# Patient Record
Sex: MALE | Race: WHITE | NOT HISPANIC OR LATINO | Employment: OTHER | URBAN - METROPOLITAN AREA
[De-identification: names, ages, dates, MRNs, and addresses within clinical notes are randomized per-mention and may not be internally consistent; named-entity substitution may affect disease eponyms.]

---

## 2019-01-30 ENCOUNTER — HOSPITAL ENCOUNTER (EMERGENCY)
Facility: CLINIC | Age: 54
Discharge: HOME | End: 2019-01-30
Attending: EMERGENCY MEDICINE | Admitting: EMERGENCY MEDICINE

## 2019-01-30 VITALS
TEMPERATURE: 98.5 F | RESPIRATION RATE: 20 BRPM | OXYGEN SATURATION: 99 % | HEART RATE: 100 BPM | BODY MASS INDEX: 38.43 KG/M2 | DIASTOLIC BLOOD PRESSURE: 85 MMHG | WEIGHT: 290 LBS | HEIGHT: 73 IN | SYSTOLIC BLOOD PRESSURE: 136 MMHG

## 2019-01-30 DIAGNOSIS — T69.029A TRENCH FOOT, UNSPECIFIED LATERALITY, INITIAL ENCOUNTER: ICD-10-CM

## 2019-01-30 PROCEDURE — 99282 EMERGENCY DEPT VISIT SF MDM: CPT | Performed by: EMERGENCY MEDICINE

## 2019-01-30 PROCEDURE — 99282 EMERGENCY DEPT VISIT SF MDM: CPT | Mod: Z6 | Performed by: EMERGENCY MEDICINE

## 2019-01-30 ASSESSMENT — ENCOUNTER SYMPTOMS
DYSURIA: 0
NAUSEA: 0
FEVER: 0
RHINORRHEA: 0
SHORTNESS OF BREATH: 0
DIARRHEA: 0
HEADACHES: 0
CHILLS: 0
PALPITATIONS: 0
VOMITING: 0
NECK PAIN: 0
ABDOMINAL PAIN: 0
SORE THROAT: 0

## 2019-01-30 ASSESSMENT — MIFFLIN-ST. JEOR: SCORE: 2204.31

## 2019-01-30 NOTE — ED TRIAGE NOTES
Patient presented to ED due to leg pain, bilateral blisters on feet, patient reported he is homeless and wanted a place to stay due to temperature. Patient also asking for prescription of organic medications.

## 2019-01-30 NOTE — DISCHARGE INSTRUCTIONS
Please make an appointment to [facility] with [facility] (phone: [phone] as soon as possible if you have any concerns.    Please try to keep your feet dry and limit walking.  He may use Tylenol and ibuprofen for your pain.    If Ty has discomfort from fever or other pain, he can have:  Acetaminophen (Tylenol) every 6 hours as needed. His dose is:    2 regular strength tabs (650 mg)                                     (43.2+ kg/96+ lb)    NOTE: If your acetaminophen (Tylenol) came with a dropper marked with 0.4 and 0.8 ml, call us ([phone]) or check with your doctor about the dose before using it.     AND/OR      Ibuprofen (Advil, Motrin) every 6 hours as needed. His/her dose is:    1 tab of the 600 mg prescription tabs                                                                  (60-80 kg/132-176 lb)

## 2019-01-30 NOTE — ED NOTES
[facility] left ED and is [facility]. Patient was given scrub pants and bus token.  Patient does have a bus pass but still wanted token so he would not use his pass.  Patient was given a phone and called aunt to pick him up. No answer and he left a message. Patient was informed that he would have to leave ED between 0630 and 0700. Patient is able to ambulate.

## 2019-01-30 NOTE — ED PROVIDER NOTES
"  History     Chief Complaint   Patient presents with     Leg Pain     bilateral leg pain, tender left leg, +blisters on both feet     HPI  Terry Carmona is a 53 year old male who with no past medical history presenting with foot pain.  The patient is homeless and he walks all day.  He only has 1 pair of socks.  He has had blisters in the bottom of his feet and his feet hurt when he walks.  No injuries or trauma.  No bleeding.  No swelling or redness to his skin.  The patient has not been outside for a long time, was just kicked out of [facility] he states.  No suicidal or homicidal ideations.  Denies drugs or alcohol use tonight.  Denies any other chest pain, fevers, chills or shortness of breath.    I have reviewed the Medications, Allergies, Past Medical and Surgical History, and Social History in the Epic system.    Review of Systems   Constitutional: Negative for chills and fever.   HENT: Negative for rhinorrhea and sore throat.    Respiratory: Negative for shortness of breath.    Cardiovascular: Negative for chest pain and palpitations.   Gastrointestinal: Negative for abdominal pain, diarrhea, nausea and vomiting.   Genitourinary: Negative for dysuria.   Musculoskeletal: Negative for neck pain.   Neurological: Negative for headaches.   Psychiatric/Behavioral: Negative for self-injury and suicidal ideas.       Physical Exam   BP: 136/85  Pulse: 100  Temp: 98.5  F (36.9  C)  Resp: 20  Height: 185.4 cm (6' 1\")  Weight: 131.5 kg (290 lb)  SpO2: 99 %      Physical Exam  Physical Exam   Constitutional: oriented to person, place, and time. appears well-developed and well-nourished.   HENT:   Head: Normocephalic and atraumatic.   Neck: Normal range of motion.   Pulmonary/Chest: Effort normal. No respiratory distress.   Cardiac: No murmurs, rubs, gallops. RRR.  Abdominal: Abdomen soft, nontender, nondistended. No rebound tenderness.  MSK: Lower extremity compartments are soft.  Range of motion in hips, " knees, ankles without restriction, no pain with range of motion of these joints.  No tenderness palpation over the pelvis, hips, thighs, knees or lower extremities.  No tenderness palpation over the ankle or foot.  The patient does have thick appearing skin on the bottom of his feet with an open blister consistent with trench foot.  All extremities are warm and well perfused with intact capillary refill.  Sensation grossly intact over the lower extremities.  Neurological: alert and oriented to person, place, and time.   Skin: Skin is warm and dry.   Psychiatric:  normal mood and affect.  behavior is normal. Thought content normal.     ED Course        Procedures    Labs Ordered and Resulted from Time of ED Arrival Up to the Time of Departure from the ED - No data to display         Assessments & Plan (with Medical Decision Making)   MDM  Patient presenting with foot pain and occasional leg spasm.  Patient here appears well and nontoxic.  He does admit that he is only here for a bad as it is very cold out today.  Patient does have trench foot, he will be given socks here in the emergency department and I did recommend trying to keep his feet dry.  We will give the patient information for local shelters and thus token if he desires.  Patient will be discharged.  There is no indication for imaging. .  No evidence of problems with joint such as septic joint or trauma.  No evidence of cellulitis or other infection.  No evidence of neurologic or vascular compromise.  No evidence of frostbite on examination.    I have reviewed the nursing notes.    I have reviewed the findings, diagnosis, plan and need for follow up with the patient.       Medication List      There are no discharge medications for this visit.         Final diagnoses:   Trench foot, unspecified laterality, initial encounter       1/30/2019   [facility], [place], EMERGENCY DEPARTMENT     Tamir Thapa MD  01/30/19 0519    Addendum: Patient stated he  wanted to leave before getting any socks.  Patient did not want a bus token or further treatment.  Patient is thinking clearly and able to make his own decisions.     Tamir Thapa MD  01/30/19 5420

## 2021-06-16 PROBLEM — L03.90 CELLULITIS: Status: ACTIVE | Noted: 2019-01-31

## 2024-03-20 ENCOUNTER — HOSPITAL ENCOUNTER (INPATIENT)
Facility: CLINIC | Age: 59
LOS: 2 days | Discharge: NURSING, REHAB OR LONG-TERM CARE | End: 2024-03-22
Attending: EMERGENCY MEDICINE | Admitting: INTERNAL MEDICINE
Payer: COMMERCIAL

## 2024-03-20 ENCOUNTER — APPOINTMENT (OUTPATIENT)
Dept: GENERAL RADIOLOGY | Facility: CLINIC | Age: 59
End: 2024-03-20
Attending: EMERGENCY MEDICINE
Payer: COMMERCIAL

## 2024-03-20 DIAGNOSIS — J18.9 COMMUNITY ACQUIRED PNEUMONIA, UNSPECIFIED LATERALITY: ICD-10-CM

## 2024-03-20 DIAGNOSIS — R09.02 HYPOXIA: ICD-10-CM

## 2024-03-20 LAB
ALBUMIN SERPL BCG-MCNC: 3.3 G/DL (ref 3.5–5.2)
ALP SERPL-CCNC: 76 U/L (ref 40–150)
ALT SERPL W P-5'-P-CCNC: 42 U/L (ref 0–70)
ANION GAP SERPL CALCULATED.3IONS-SCNC: 10 MMOL/L (ref 7–15)
ANION GAP SERPL CALCULATED.3IONS-SCNC: 12 MMOL/L (ref 7–15)
AST SERPL W P-5'-P-CCNC: 39 U/L (ref 0–45)
BASE EXCESS BLDV CALC-SCNC: 1.2 MMOL/L (ref -3–3)
BASOPHILS # BLD AUTO: 0.1 10E3/UL (ref 0–0.2)
BASOPHILS NFR BLD AUTO: 0 %
BILIRUB SERPL-MCNC: 0.6 MG/DL
BUN SERPL-MCNC: 10.6 MG/DL (ref 8–23)
BUN SERPL-MCNC: 7.3 MG/DL (ref 8–23)
CALCIUM SERPL-MCNC: 8.3 MG/DL (ref 8.8–10.2)
CALCIUM SERPL-MCNC: 8.4 MG/DL (ref 8.8–10.2)
CHLORIDE SERPL-SCNC: 104 MMOL/L (ref 98–107)
CHLORIDE SERPL-SCNC: 110 MMOL/L (ref 98–107)
CREAT SERPL-MCNC: 0.61 MG/DL (ref 0.67–1.17)
CREAT SERPL-MCNC: 0.73 MG/DL (ref 0.67–1.17)
DEPRECATED HCO3 PLAS-SCNC: 22 MMOL/L (ref 22–29)
DEPRECATED HCO3 PLAS-SCNC: 24 MMOL/L (ref 22–29)
EGFRCR SERPLBLD CKD-EPI 2021: >90 ML/MIN/1.73M2
EGFRCR SERPLBLD CKD-EPI 2021: >90 ML/MIN/1.73M2
EOSINOPHIL # BLD AUTO: 0.1 10E3/UL (ref 0–0.7)
EOSINOPHIL NFR BLD AUTO: 0 %
ERYTHROCYTE [DISTWIDTH] IN BLOOD BY AUTOMATED COUNT: 13.2 % (ref 10–15)
ERYTHROCYTE [DISTWIDTH] IN BLOOD BY AUTOMATED COUNT: 13.3 % (ref 10–15)
GLUCOSE SERPL-MCNC: 124 MG/DL (ref 70–99)
GLUCOSE SERPL-MCNC: 148 MG/DL (ref 70–99)
HCO3 BLDV-SCNC: 26 MMOL/L (ref 21–28)
HCT VFR BLD AUTO: 35.9 % (ref 40–53)
HCT VFR BLD AUTO: 37.7 % (ref 40–53)
HGB BLD-MCNC: 11.7 G/DL (ref 13.3–17.7)
HGB BLD-MCNC: 12.3 G/DL (ref 13.3–17.7)
HOLD SPECIMEN: NORMAL
IMM GRANULOCYTES # BLD: 0.1 10E3/UL
IMM GRANULOCYTES NFR BLD: 1 %
LACTATE SERPL-SCNC: 1 MMOL/L (ref 0.7–2)
LYMPHOCYTES # BLD AUTO: 1 10E3/UL (ref 0.8–5.3)
LYMPHOCYTES NFR BLD AUTO: 6 %
MCH RBC QN AUTO: 30.5 PG (ref 26.5–33)
MCH RBC QN AUTO: 31.3 PG (ref 26.5–33)
MCHC RBC AUTO-ENTMCNC: 32.6 G/DL (ref 31.5–36.5)
MCHC RBC AUTO-ENTMCNC: 32.6 G/DL (ref 31.5–36.5)
MCV RBC AUTO: 94 FL (ref 78–100)
MCV RBC AUTO: 96 FL (ref 78–100)
MONOCYTES # BLD AUTO: 1.5 10E3/UL (ref 0–1.3)
MONOCYTES NFR BLD AUTO: 9 %
NEUTROPHILS # BLD AUTO: 14.5 10E3/UL (ref 1.6–8.3)
NEUTROPHILS NFR BLD AUTO: 84 %
NRBC # BLD AUTO: 0 10E3/UL
NRBC BLD AUTO-RTO: 0 /100
O2/TOTAL GAS SETTING VFR VENT: 2 %
OXYHGB MFR BLDV: 86 % (ref 70–75)
PCO2 BLDV: 39 MM HG (ref 40–50)
PH BLDV: 7.43 [PH] (ref 7.32–7.43)
PLATELET # BLD AUTO: 246 10E3/UL (ref 150–450)
PLATELET # BLD AUTO: 252 10E3/UL (ref 150–450)
PO2 BLDV: 52 MM HG (ref 25–47)
POTASSIUM SERPL-SCNC: 3.5 MMOL/L (ref 3.4–5.3)
POTASSIUM SERPL-SCNC: 3.5 MMOL/L (ref 3.4–5.3)
PROCALCITONIN SERPL IA-MCNC: 0.63 NG/ML
PROT SERPL-MCNC: 6.2 G/DL (ref 6.4–8.3)
RBC # BLD AUTO: 3.84 10E6/UL (ref 4.4–5.9)
RBC # BLD AUTO: 3.93 10E6/UL (ref 4.4–5.9)
SAO2 % BLDV: 88.3 % (ref 70–75)
SODIUM SERPL-SCNC: 138 MMOL/L (ref 135–145)
SODIUM SERPL-SCNC: 144 MMOL/L (ref 135–145)
WBC # BLD AUTO: 16.4 10E3/UL (ref 4–11)
WBC # BLD AUTO: 17.1 10E3/UL (ref 4–11)

## 2024-03-20 PROCEDURE — 82805 BLOOD GASES W/O2 SATURATION: CPT | Performed by: EMERGENCY MEDICINE

## 2024-03-20 PROCEDURE — 82040 ASSAY OF SERUM ALBUMIN: CPT | Performed by: EMERGENCY MEDICINE

## 2024-03-20 PROCEDURE — 93005 ELECTROCARDIOGRAM TRACING: CPT | Performed by: EMERGENCY MEDICINE

## 2024-03-20 PROCEDURE — 87040 BLOOD CULTURE FOR BACTERIA: CPT | Performed by: EMERGENCY MEDICINE

## 2024-03-20 PROCEDURE — 36415 COLL VENOUS BLD VENIPUNCTURE: CPT | Performed by: EMERGENCY MEDICINE

## 2024-03-20 PROCEDURE — 250N000011 HC RX IP 250 OP 636: Performed by: EMERGENCY MEDICINE

## 2024-03-20 PROCEDURE — 250N000013 HC RX MED GY IP 250 OP 250 PS 637: Performed by: EMERGENCY MEDICINE

## 2024-03-20 PROCEDURE — 96366 THER/PROPH/DIAG IV INF ADDON: CPT

## 2024-03-20 PROCEDURE — 99207 PR APP CREDIT; MD BILLING SHARED VISIT: CPT | Mod: GC | Performed by: HOSPITALIST

## 2024-03-20 PROCEDURE — 99285 EMERGENCY DEPT VISIT HI MDM: CPT | Mod: 25 | Performed by: EMERGENCY MEDICINE

## 2024-03-20 PROCEDURE — 71046 X-RAY EXAM CHEST 2 VIEWS: CPT

## 2024-03-20 PROCEDURE — 84145 PROCALCITONIN (PCT): CPT | Performed by: EMERGENCY MEDICINE

## 2024-03-20 PROCEDURE — 83605 ASSAY OF LACTIC ACID: CPT | Performed by: EMERGENCY MEDICINE

## 2024-03-20 PROCEDURE — 93010 ELECTROCARDIOGRAM REPORT: CPT | Performed by: EMERGENCY MEDICINE

## 2024-03-20 PROCEDURE — 85027 COMPLETE CBC AUTOMATED: CPT

## 2024-03-20 PROCEDURE — 71046 X-RAY EXAM CHEST 2 VIEWS: CPT | Mod: 26 | Performed by: RADIOLOGY

## 2024-03-20 PROCEDURE — 258N000003 HC RX IP 258 OP 636: Performed by: EMERGENCY MEDICINE

## 2024-03-20 PROCEDURE — 99223 1ST HOSP IP/OBS HIGH 75: CPT | Mod: GC | Performed by: INTERNAL MEDICINE

## 2024-03-20 PROCEDURE — 258N000003 HC RX IP 258 OP 636

## 2024-03-20 PROCEDURE — 120N000002 HC R&B MED SURG/OB UMMC

## 2024-03-20 PROCEDURE — 85025 COMPLETE CBC W/AUTO DIFF WBC: CPT | Performed by: EMERGENCY MEDICINE

## 2024-03-20 PROCEDURE — 36415 COLL VENOUS BLD VENIPUNCTURE: CPT

## 2024-03-20 PROCEDURE — 96365 THER/PROPH/DIAG IV INF INIT: CPT

## 2024-03-20 PROCEDURE — 96368 THER/DIAG CONCURRENT INF: CPT

## 2024-03-20 RX ORDER — IBUPROFEN 600 MG/1
600 TABLET, FILM COATED ORAL ONCE
Status: COMPLETED | OUTPATIENT
Start: 2024-03-20 | End: 2024-03-20

## 2024-03-20 RX ORDER — ACETAMINOPHEN 325 MG/1
650 TABLET ORAL EVERY 4 HOURS PRN
Status: DISCONTINUED | OUTPATIENT
Start: 2024-03-20 | End: 2024-03-22 | Stop reason: HOSPADM

## 2024-03-20 RX ORDER — CEFTRIAXONE 2 G/1
2 INJECTION, POWDER, FOR SOLUTION INTRAMUSCULAR; INTRAVENOUS EVERY 24 HOURS
Status: DISCONTINUED | OUTPATIENT
Start: 2024-03-21 | End: 2024-03-22 | Stop reason: HOSPADM

## 2024-03-20 RX ORDER — AMOXICILLIN 250 MG
1 CAPSULE ORAL 2 TIMES DAILY PRN
Status: DISCONTINUED | OUTPATIENT
Start: 2024-03-20 | End: 2024-03-22 | Stop reason: HOSPADM

## 2024-03-20 RX ORDER — CALCIUM CARBONATE 500 MG/1
1000 TABLET, CHEWABLE ORAL 4 TIMES DAILY PRN
Status: DISCONTINUED | OUTPATIENT
Start: 2024-03-20 | End: 2024-03-22 | Stop reason: HOSPADM

## 2024-03-20 RX ORDER — DIPHENOXYLATE HCL/ATROPINE 2.5-.025MG
1 TABLET ORAL
Status: ON HOLD | COMMUNITY
Start: 2024-03-09 | End: 2024-03-22

## 2024-03-20 RX ORDER — AMOXICILLIN 250 MG
2 CAPSULE ORAL 2 TIMES DAILY PRN
Status: DISCONTINUED | OUTPATIENT
Start: 2024-03-20 | End: 2024-03-22 | Stop reason: HOSPADM

## 2024-03-20 RX ORDER — ONDANSETRON 2 MG/ML
4 INJECTION INTRAMUSCULAR; INTRAVENOUS EVERY 6 HOURS PRN
Status: DISCONTINUED | OUTPATIENT
Start: 2024-03-20 | End: 2024-03-22 | Stop reason: HOSPADM

## 2024-03-20 RX ORDER — IBUPROFEN 600 MG/1
600 TABLET, FILM COATED ORAL
Status: ON HOLD | COMMUNITY
Start: 2024-03-19 | End: 2024-03-22

## 2024-03-20 RX ORDER — CEFTRIAXONE 2 G/1
2 INJECTION, POWDER, FOR SOLUTION INTRAMUSCULAR; INTRAVENOUS ONCE
Qty: 20 ML | Refills: 0 | Status: COMPLETED | OUTPATIENT
Start: 2024-03-20 | End: 2024-03-20

## 2024-03-20 RX ORDER — LIDOCAINE 40 MG/G
CREAM TOPICAL
Status: DISCONTINUED | OUTPATIENT
Start: 2024-03-20 | End: 2024-03-22 | Stop reason: HOSPADM

## 2024-03-20 RX ORDER — ACETAMINOPHEN 650 MG/1
650 SUPPOSITORY RECTAL EVERY 4 HOURS PRN
Status: DISCONTINUED | OUTPATIENT
Start: 2024-03-20 | End: 2024-03-22 | Stop reason: HOSPADM

## 2024-03-20 RX ORDER — LOPERAMIDE HYDROCHLORIDE 2 MG/1
TABLET ORAL
Status: ON HOLD | COMMUNITY
Start: 2024-03-08 | End: 2024-03-22

## 2024-03-20 RX ORDER — ONDANSETRON 4 MG/1
4 TABLET, ORALLY DISINTEGRATING ORAL EVERY 6 HOURS PRN
Status: DISCONTINUED | OUTPATIENT
Start: 2024-03-20 | End: 2024-03-22 | Stop reason: HOSPADM

## 2024-03-20 RX ADMIN — SODIUM CHLORIDE 1000 ML: 9 INJECTION, SOLUTION INTRAVENOUS at 02:43

## 2024-03-20 RX ADMIN — AZITHROMYCIN MONOHYDRATE 500 MG: 500 INJECTION, POWDER, LYOPHILIZED, FOR SOLUTION INTRAVENOUS at 03:35

## 2024-03-20 RX ADMIN — CEFTRIAXONE SODIUM 2 G: 2 INJECTION, POWDER, FOR SOLUTION INTRAMUSCULAR; INTRAVENOUS at 02:42

## 2024-03-20 RX ADMIN — SODIUM CHLORIDE, POTASSIUM CHLORIDE, SODIUM LACTATE AND CALCIUM CHLORIDE 1000 ML: 600; 310; 30; 20 INJECTION, SOLUTION INTRAVENOUS at 10:16

## 2024-03-20 RX ADMIN — IBUPROFEN 600 MG: 600 TABLET, FILM COATED ORAL at 02:40

## 2024-03-20 ASSESSMENT — ACTIVITIES OF DAILY LIVING (ADL)
ADLS_ACUITY_SCORE: 35

## 2024-03-20 ASSESSMENT — COLUMBIA-SUICIDE SEVERITY RATING SCALE - C-SSRS: 1. IN THE PAST MONTH, HAVE YOU WISHED YOU WERE DEAD OR WISHED YOU COULD GO TO SLEEP AND NOT WAKE UP?: NO

## 2024-03-20 NOTE — ED NOTES
03-Aug-2017 Bed: ED06  Expected date:   Expected time:   Means of arrival:   Comments:  [id number]   58M   SOB   yellow

## 2024-03-20 NOTE — MEDICATION SCRIBE - ADMISSION MEDICATION HISTORY
Medication Scribe Admission Medication History    Admission medication history is complete. The information provided in this note is only as accurate as the sources available at the time of the update.    Information Source(s): Patient via in-person    Pertinent Information: Writer present to interview Pt for update on medications on PTA medication list, pt responded with a long conversation expressing frustration on his homelessness, haven't slept for the last 7 days outside in the cold, and no one cares. Pt continue to voice his frustration over extraterrestrial life, black dart, and the Food and Drug Administration, trying to get him to take synthetic drugs that is not good for him, therefore, he's not taking any of the medication listed on PTA medication list. Per pt chart, all recent medications has been pull from outside medication list and no DRH.      Changes made to PTA medication list:  Added: All  Deleted: None  Changed: None    Allergies reviewed with patient and updates made in EHR: yes    Medication History Completed By: Darling Zapata 3/20/2024 4:26 AM    PTA Med List   Medication Sig Last Dose    diphenoxylate-atropine (LOMOTIL) 2.5-0.025 MG tablet Take 1 tablet by mouth Unknown    ibuprofen (ADVIL/MOTRIN) 600 MG tablet Take 600 mg by mouth Unknown    loperamide (IMODIUM A-D) 2 MG tablet Take two tablets by mouth initially, then one tablet by mouth after each loose stool.  Maximum 8 tablets per day. Unknown    zinc oxide (DESITIN) 40 % paste Apply topically to affected area(s) 4 times daily as needed for Dry Skin. Unknown       show

## 2024-03-20 NOTE — PROGRESS NOTES
[facility]    Progress Note - Medicine Service, MAROON TEAM 2       Date of Admission:  3/20/2024    Assessment & Plan   Ty KLEBER Carmona is a 58 year old male w/ pmhx of bipolar and unstable housing who presents to the ED for diarrhea and cough concerning for CAP and gastroenteritis     # CAP  # Acute Hypoxemic Respiratory Failure, resolved  Patient presenting with 1wk history of URI sx (rhinorrhea, productive cough), recently started sleeping outside in that time frame, no fever/chills. CXR with bibasilar infiltrates consistent with infection, and bilateral crackles at the bases L >R on physical exam with notable leukocytosis and mild hypoxia requiring 2L NC. On room air when interviewed by staff in the evening.   - Patient declining flu/RSV/COVID swab  - Consider RVP if patient declines on below abx  - 1L LR  - Cont Azithro + CTX started in the ED, plan for 5d treatment coarse     # Diarrhea c/f gastroenteritis  Patient with intermittent diarrhea over the last 10 days, has continued to be able to eat and keep hydrated. Likely gastroenteritis, likely toxin-mediated vs viral vs bacterial in the setting of food insecurity and risk for foodborne illness. Hemodynamically stable, no hypotension, no clear sign of intravascular depletion, BMP reassuring.  - Consider C diff and enteric panel if continuing to have diarrhea     Diet: Combination Diet Regular Diet Adult    DVT Prophylaxis: Low Risk/Ambulatory with no VTE prophylaxis indicated  Rivas Catheter: Not present  Fluids: 1L LR  Lines: None     Cardiac Monitoring: None  Code Status: Full Code      Clinically Significant Risk Factors Present on Admission          # Hypocalcemia: Lowest Ca = 8.3 mg/dL in last 2 days, will monitor and replace as appropriate     # Hypoalbuminemia: Lowest albumin = 3.3 g/dL at 3/20/2024  2:14 AM, will monitor as appropriate          # Obesity: Estimated body mass index is 33.97 kg/m  as  "calculated from the following:    Height as of this encounter: 1.88 m (6' 2\").    Weight as of this encounter: 120 kg (264 lb 8.8 oz).              Disposition Plan      Expected Discharge Date: 03/22/2024                The patient's care was discussed with the Attending Physician, Dr. Nascimento .    Aparna Bhatti MD  Medicine Service, [address]  Securely message with [employer] (more info)  Text page via [facility] Paging/Directory   See signed in provider for up to date coverage information  ______________________________________________________________________    Interval History   Please see H&P for overnight events.    This morning patient expresses frustration with being woken up. Uses frequent expletives while deferring additional questions. Responded that questions are asked to assess improvement in symptoms. He continued to use multiple expletives and was yelling - I told him cursing at staff would not be tolerated. Patient continued cursing at myself. I told him he could speak with staff later on and left the room.     Physical Exam   Vital Signs: Temp: (!) 100.7  F (38.2  C) Temp src: Oral BP: 119/57 Pulse: 91   Resp: 17 SpO2: 94 % O2 Device: None (Room air) Oxygen Delivery: 2 LPM  Weight: 264 lbs 8.83 oz    Deferred given agitation and profanity.     Constitutional: [place] pulled over head, unable to assess    Data   Reviewed from last night  "

## 2024-03-20 NOTE — ED TRIAGE NOTES
BP (!) 161/63   Pulse (!) 127   Temp (!) 100.7  F (38.2  C) (Oral)   Resp 18   SpO2 92%     Patient BIBA from [facility] due to SOB and some cough. Patient states that he is shelter less.      Triage Assessment (Adult)       Row Name 03/20/24 0205          Triage Assessment    Airway WDL WDL        Respiratory WDL    Respiratory WDL WDL        Skin Circulation/Temperature WDL    Skin Circulation/Temperature WDL WDL        Cardiac WDL    Cardiac WDL X     Cardiac Rhythm ST        Peripheral/Neurovascular WDL    Peripheral Neurovascular WDL WDL        Cognitive/Neuro/Behavioral WDL    Cognitive/Neuro/Behavioral WDL WDL

## 2024-03-20 NOTE — H&P
[facility]    History and Physical - Medicine Service, MAROON TEAM        Date of Admission:  3/20/2024    Assessment & Plan   Ty KLEBER Carmona is a 58 year old male w/ pmhx of bipolar and unstable housing who presents to the ED for diarrhea and cough concerning for CAP and gastroenteritis    # CAP  # Mild Acute Hypoxic Respiratory Failure  Patient presenting with 1wk history of URI sx (rhinorrhea, productive cough), recently started sleeping outside in that time frame, no fever/chills. CXR with bibasilar infiltrates consistent with infection, and bilateral crackles at the bases L >R on physical exam with notable leukocytosis and mild hypoxia requiring 2L NC.  - Cont Azithro + CTX started in the ED, plan for 5d treatment coarse  - Defer sputum culture, suspect will not significantly  at this juncture  - Defer RVP  - Flu/Influenza/Covid pending collection  - Defer AM labs given recent collection 0215, can consider PM labs.    # Diarrhea c/f gastroenteritis  Patient with intermittent diarrhea over the last 10 days, has continued to be able to eat and keep hydrated. Likely gastroenteritis, likely toxin-mediated vs viral vs bacterial in the setting of food insecurity and risk for foodborne illness. Hemodynamically stable, no hypotension, no clear sign of intravascular depletion, BMP reassuring.  - Supportive cares  - Consider C diff and enteric panel if continuing to have diarrhea    Diet:  Regular  DVT Prophylaxis: Low Risk/Ambulatory with no VTE prophylaxis indicated, SCDs  Rivas Catheter: Not present  Fluids: None  Lines: None     Cardiac Monitoring: None  Code Status:  Full    Clinically Significant Risk Factors Present on Admission          # Hypocalcemia: Lowest Ca = 8.4 mg/dL in last 2 days, will monitor and replace as appropriate     # Hypoalbuminemia: Lowest albumin = 3.3 g/dL at 3/20/2024  2:14 AM, will monitor as appropriate                  "    Disposition Plan      Expected Discharge Date: 03/22/2024                The patient's care was discussed with the Attending Physician, Dr. Funez .      Edmundo Jain MD  Medicine Service, [facility]  Securely message with [employer] (more info)  Text page via [facility] Paging/Directory   See signed in provider for up to date coverage information  ______________________________________________________________________    Chief Complaint   Diarrhea, Cough    History is obtained from the patient    History of Present Illness   Ty KLEBER Carmona is a 58 year old male w/ pmhx of bipolar and unstable housing  who presents to the ED for diarrhea and cough.    Patient reports that he has had unstable housing since late 2/2024, and has slept outside for at least the last week. Began having diarrhea about 10 days ago, and having URI sx (runny nose, productive cough) around 7d ago. Has intermittently resorted to \"eating trash\" though not regularly, does not currently believe this is related to his GI symptoms.    Patient overall pleasant. Had extensive conversation regarding hexagonal water (as components of various fruits/vegetables, health benefits, etc) and his network of wealthy individuals. 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    Of note, patient has presented to [facility], [facility], and [facility] for similar within the last 2 weeks.  1 wk URI, 3d diarrhea, CAP sx, 87-88, low-mid 90s on NC. White count to 17. CTX and Azithro with fluids.    In the ED:   Vitals: 160/60, HR 120s, febrile to 100.7, on 2L NC  Labs: WBC to 17.1, Hgb 11.7, CMP overall reassuring, VBG 7.43/39/52/26, procal mildly elevated to 0.63, lactate wnl  Imaging: CXR with bibasilar infiltrates L > R representing likely infectious vs inflammatory process.  Interventions: Started CTX + Azithro, given ibuprofen 600mg      Past Medical History    No past " medical history on file.    Past Surgical History   No past surgical history on file.    Prior to Admission Medications   None        Physical Exam   Vital Signs: Temp: (!) 100.7  F (38.2  C) Temp src: Oral BP: (!) 161/63 Pulse: (!) 127   Resp: 18 SpO2: 92 % O2 Device: Nasal cannula Oxygen Delivery: 2 LPM  Weight: 0 lbs 0 oz    Constitutional: awake, alert, cooperative, no apparent distress, and appears stated age  Respiratory: No increased work of breathing, good air exchange, clear to auscultation bilaterally, no crackles or wheezing  Cardiovascular: Regular rate and rhythm, no murmur noted  GI: Normal bowel sounds, soft, non-distended, non-tender    Data     I have personally reviewed the following data over the past 24 hrs:    17.1 (H)  \   11.7 (L)   / 246     138 104 10.6 /  148 (H)   3.5 22 0.73 \     ALT: 42 AST: 39 AP: 76 TBILI: 0.6   ALB: 3.3 (L) TOT PROTEIN: 6.2 (L) LIPASE: N/A     Procal: 0.63 (H) CRP: N/A Lactic Acid: 1.0         Imaging results reviewed over the past 24 hrs:   Recent Results (from the past 24 hour(s))   Chest XR,  PA & LAT    Narrative    EXAM: XR CHEST 2 VIEWS  LOCATION: [facility]  DATE: 3/20/2024    INDICATION: Cough. Fever.  COMPARISON: None.      Impression    IMPRESSION: Bibasilar infiltrates, more on the left, likely an infectious or an inflammatory process. No cavitation, suspicious adenopathy or pleural effusion on either side. Normal cardiac size and pulmonary vascularity. Degenerative changes both   shoulders and the spine. Monitoring leads overlying the chest. No prior study available for comparison. Current study will serve as a baseline for future follow-up.      Normal for race

## 2024-03-20 NOTE — PROGRESS NOTES
"Pt refusing routine vitals for duration of shift. Has been rude to staff and using profanities. Pt called nurse into room and stated that he had defecated and urinated into garbage can because he \"couldn't wait\" to walk to the bathroom. Writer emptied garbage can and brought commode into room for future use.  "

## 2024-03-20 NOTE — ED PROVIDER NOTES
"  History     Chief Complaint   Patient presents with    Shortness of Breath     HPI  Terry Carmona is a 58 year old male with PMH notable for bipolar disorder, homelessness  who presents to the ED with cough and diarrhea.  Patient reports cough started nearly a week ago, associated with sore throat.  Diarrhea started 3 days ago, was decreased this past day.  Not having abdominal pain, is nauseous but has not had vomiting.  He is unsure if he has had fever, no bodyaches.  Does feel somewhat short of breath.  Patient notes being at [facility] recently for evaluation of similar symptoms.      EMS notes initial SpO2 of 88% on room air, placed 2 L O2 by NC with rise into the 90s.  EMS performed lung ultrasound, showed images to this provider.  Ultrasound (3 clips shown) demonstrated A-line predominance with 1 B-line per field in each clip.    Physical Exam   BP: (!) 161/63  Pulse: (!) 127  Temp: (!) 100.7  F (38.2  C)  Resp: 18  Height: 188 cm (6' 2\")  Weight: 120 kg (264 lb 8.8 oz)  SpO2: 92 %    Physical Exam  General: Appears mildly dyspneic. Appears stated age.   HENT: MMM, no oropharyngeal lesions  Eyes: PERRL, normal sclerae   Cardio: Tachycardic rate. Regular rhythm. Extremities well perfused  Resp: Mildly increased work of breathing, Normal respiratory rate.   Abdomen: no tenderness, non-distended, no rebound, no guarding  Neuro: alert without signs of confusion. CN II-XII grossly intact. Grossly normal strength and sensation in all extremities.   Psych: normal affect, normal behavior      ED Course      Procedures            EKG Interpretation:      Interpreted by Tomas Salamanca MD  Time reviewed: 0220  Symptoms at time of EKG: SOB   Rhythm: sinus tachycardia  Rate: 116  Axis: Normal  Ectopy: none  Conduction: normal  ST Segments/ T Waves: No acute ischemic changes  Q Waves: none  Comparison to prior: No old EKG available    Clinical Impression: Sinus tachycardia without evidence of acute ischemia     Labs " Ordered and Resulted from Time of ED Arrival to Time of ED Departure   COMPREHENSIVE METABOLIC PANEL - Abnormal       Result Value    Sodium 138      Potassium 3.5      Carbon Dioxide (CO2) 22      Anion Gap 12      Urea Nitrogen 10.6      Creatinine 0.73      GFR Estimate >90      Calcium 8.4 (*)     Chloride 104      Glucose 148 (*)     Alkaline Phosphatase 76      AST 39      ALT 42      Protein Total 6.2 (*)     Albumin 3.3 (*)     Bilirubin Total 0.6     BLOOD GAS VENOUS - Abnormal    pH Venous 7.43      pCO2 Venous 39 (*)     pO2 Venous 52 (*)     Bicarbonate Venous 26      Base Excess/Deficit Venous 1.2      FIO2 2      Oxyhemoglobin Venous 86 (*)     O2 Sat, Venous 88.3 (*)    PROCALCITONIN - Abnormal    Procalcitonin 0.63 (*)    CBC WITH PLATELETS AND DIFFERENTIAL - Abnormal    WBC Count 17.1 (*)     RBC Count 3.84 (*)     Hemoglobin 11.7 (*)     Hematocrit 35.9 (*)     MCV 94      MCH 30.5      MCHC 32.6      RDW 13.3      Platelet Count 246      % Neutrophils 84      % Lymphocytes 6      % Monocytes 9      % Eosinophils 0      % Basophils 0      % Immature Granulocytes 1      NRBCs per 100 WBC 0      Absolute Neutrophils 14.5 (*)     Absolute Lymphocytes 1.0      Absolute Monocytes 1.5 (*)     Absolute Eosinophils 0.1      Absolute Basophils 0.1      Absolute Immature Granulocytes 0.1      Absolute NRBCs 0.0     LACTIC ACID WHOLE BLOOD - Normal    Lactic Acid 1.0     INFLUENZA A/B, RSV, & SARS-COV2 PCR   BLOOD CULTURE   BLOOD CULTURE     Chest XR,  PA & LAT   Final Result   IMPRESSION: Bibasilar infiltrates, more on the left, likely an infectious or an inflammatory process. No cavitation, suspicious adenopathy or pleural effusion on either side. Normal cardiac size and pulmonary vascularity. Degenerative changes both    shoulders and the spine. Monitoring leads overlying the chest. No prior study available for comparison. Current study will serve as a baseline for future follow-up.               Medical  Decision Making  The patient's presentation was of high complexity (an acute health issue posing potential threat to life or bodily function).    The patient's evaluation involved:  ordering and/or review of 3+ test(s) in this encounter (see separate area of note for details)  independent interpretation of testing performed by another health professional (CXR)  discussion of management or test interpretation with another health professional (admitting hospitalist)    The patient's management necessitated high risk (a decision regarding hospitalization).      Assessments & Plan   Patient presenting with cough and shortness of breath. Vitals in the ED notable for fever 100.7 Fahrenheit, SpO2 in upper 80s on room air and low to mid 90s on 2 L O2 by NC, tachycardia in 120s. Nursing notes reviewed. Initial differential diagnosis includes but not limited to sepsis, pneumonia, viral URI, COVID, influenza.     Chart review notable for ED visits at [facility] and [facility] over the past few days.  Chest x-ray report from [facility] notes diffuse infiltrate suggestive of potential pulmonary edema.  In clinical context this is more likely pneumonia.    Tonight, chest x-ray again shows infiltrates this time more on the left and in the bases.  Leukocytosis present with WBC of 17.  Lactate normal.  Procalcitonin is mildly elevated 0.63.  Viral studies ordered and pending.  Blood cultures drawn.    Ceftriaxone and azithromycin given for likely community-acquired pneumonia.  NS bolus given.  Heart rate improving.    The complete clinical picture is most consistent with community-acquired pneumonia with hypoxia. After counseling on the diagnosis, work-up, and treatment plan, the patient was admitted to medicine.    Final diagnoses:   Community acquired pneumonia, unspecified laterality   Hypoxia     New Prescriptions    No medications on file     --  Tomas Salamanca MD   Emergency Medicine   [facility] EMERGENCY  DEPARTMENT  3/20/2024       Tomas Salamanca MD  03/20/24 6292

## 2024-03-21 PROBLEM — J18.9 COMMUNITY ACQUIRED PNEUMONIA: Status: ACTIVE | Noted: 2024-03-21

## 2024-03-21 LAB
ATRIAL RATE - MUSE: 116 BPM
DIASTOLIC BLOOD PRESSURE - MUSE: NORMAL MMHG
INTERPRETATION ECG - MUSE: NORMAL
P AXIS - MUSE: 74 DEGREES
PR INTERVAL - MUSE: 138 MS
QRS DURATION - MUSE: 86 MS
QT - MUSE: 310 MS
QTC - MUSE: 430 MS
R AXIS - MUSE: 57 DEGREES
SYSTOLIC BLOOD PRESSURE - MUSE: NORMAL MMHG
T AXIS - MUSE: 39 DEGREES
VENTRICULAR RATE- MUSE: 116 BPM

## 2024-03-21 PROCEDURE — G0378 HOSPITAL OBSERVATION PER HR: HCPCS

## 2024-03-21 PROCEDURE — 99233 SBSQ HOSP IP/OBS HIGH 50: CPT | Performed by: INTERNAL MEDICINE

## 2024-03-21 PROCEDURE — 120N000002 HC R&B MED SURG/OB UMMC

## 2024-03-21 ASSESSMENT — ACTIVITIES OF DAILY LIVING (ADL)
ADLS_ACUITY_SCORE: 39

## 2024-03-21 NOTE — PROGRESS NOTES
2058    6MS ADMISSION    D: Patient admitted from [place] via stretcher for Hypoxia, cough and diarrhea.     I: Upon arrival to the unit patient was oriented to room, unit, and call light. Patient s height, weight, and vital signs were obtained. NKA. Provider notified of patient s arrival on the unit. Patient refused full skin assessment and adult AVS. Care plan initiated.    A: Vital signs stable upon admission. No reports of pain. Refused full skin assessment.    P: Continue to monitor patient and intervene as needed. Continue with plan of care. Notify provider with any concerns or changes in patient status.

## 2024-03-21 NOTE — PLAN OF CARE
"2058-0700    Patient is A&O x4. Call light within reach, able to make needs known. SBA/independent in the room. Voids spontaneously to the bathroom. LBM: 03/20.    RA. Regular diet. PIV on R hand, SL.     Upon arrival to the patient's room, patient refused full skin assessment and verbalized \"I don't want to, my body cleanses itself. I don't want to be disturbed. I just wanted to know where's the bathroom at and wanted to sleep. I wanted to stay here because I am homeless and I needed a place to stay.\" Explained the importance of full assessment and monitoring but patient insisted not to be checked. Patient opted to keep his clothes and refused to changed into gown. Refused to answer admission questions and said we can try again in the morning. Refused COVID swab and scheduled antibiotics, provider notified.     0257- Patient triggered sepsis. Patient continuously refusing cares, medications and assessment. Scheduled Lactic Acid draw at 0800. Provider notified.     Agreed to do initial vital signs. Noted infrequent dry congested cough. Continue with POC.   "

## 2024-03-21 NOTE — PROGRESS NOTES
"Got report from outgoing RN Cathy Pemberton, went to assess and get vitals from pt but he refused and and asked that writer \"walk out and tell them I refused.\" EMS picked pt up around 2020 to [facility] 6MS via ambulance  "

## 2024-03-21 NOTE — PROGRESS NOTES
"[id number]    Pt alert and oriented times 4, uncooperative. Pt refused any assessment and cares and said \"Get out and close the door!\". Pt refused to nasal swab for Respiratory panel PCR , Pt wanted oral swab. Lab called if I can do oral swab and they said we do not accept oral swab.  No acute event during this shift, call light with in reach, and continue with POC.     Expected Discharge Date: 03/23/2024         Blood pressure 135/86, pulse 96, temperature 98.4  F (36.9  C), temperature source Oral, resp. rate 18, height 1.88 m (6' 2\"), weight 120 kg (264 lb 8.8 oz), SpO2 98%.     "

## 2024-03-21 NOTE — UTILIZATION REVIEW
"    Admission Status; Secondary Review Determination         Under the authority of the Utilization Management Committee, the utilization review process indicated a secondary review on the above patient.  The review outcome is based on review of the medical records, discussions with staff, and applying clinical experience noted on the date of the review.          (x) Observation Status Appropriate - This patient does not meet hospital inpatient criteria and is placed in observation status. If this patient's primary payer is Medicare and was admitted as an inpatient, Condition Code 44 should be used and patient status changed to \"observation\".     RATIONALE FOR DETERMINATION    58-year-old male with a history of bipolar disorder and unstable housing presented to the ED with symptoms suggestive of community-acquired pneumonia (CAP) and gastroenteritis. He exhibited a one-week history of upper respiratory tract infection symptoms and recent outdoor sleeping, with CXR revealing bibasilar infiltrates consistent with infection and bilateral crackles on exam. Treatment with azithromycin and ceftriaxone was initiated, with plans for a five-day course. Additionally, he experienced intermittent diarrhea over the past ten days, suspected to be gastroenteritis, with supportive care provided and consideration for further testing if symptoms persist. Despite initial mild hypoxia requiring 2L nasal cannula, his oxygen saturation this morning improved to 96% on room air.  The severity of illness, intensity of service provided, expected LOS and risk for adverse outcome make the care appropriate for further observation; however, doesn't meet criteria for hospital inpatient admission. Dr Roach  notified of this determination.    This document was produced using voice recognition software.      The information on this document is developed by the utilization review team in order for the business office to ensure compliance.  This " only denotes the appropriateness of proper admission status and does not reflect the quality of care rendered.         The definitions of Inpatient Status and Observation Status used in making the determination above are those provided in the CMS Coverage Manual, Chapter 1 and Chapter 6, section 70.4.      Sincerely,     MAX LAW MD    System Medical Director  Utilization [facility].

## 2024-03-21 NOTE — PLAN OF CARE
"Goal Outcome Evaluation:  Plan of Care Reviewed With: patient  Overall Patient Progress: improvingOverall Patient Progress: improving    VS: Temp: 99.4  F (37.4  C) Temp src: Oral BP: 133/83 Pulse: 98   Resp: 19 SpO2: 96 % O2 Device: None (Room air)      O2: RA   Output: Voids spontaneously in bathroom   Last BM: Unknown, and patient refused assessment   Activity: Independent, self-ambulated in freed way   Skin: Refused skin assessment, superficial skin is intact   Pain: Denied    CMS: A&O x 4, mild generalized weakness   Dressing: Unable to assess   Diet: Regular, appetite was adequate    LDA: RAYOEL   Equipment: Personal items   Plan: Potential discharge tomorrow. Pt can communicate his care, call light within reach. Continue to monitor   Additional Info: Pt is uncooperative with care some times, discriminates staff and said \" I am white to white racist, I only wants white doctors, nurses/ lab staff\"  Pt refused Lab draw this morning, after two unsuccessful attempts by lab staff provider talk to patient and reschedule lab draw for tomorrow. Charge nurse and unit manager are aware patient's behavior.     "

## 2024-03-21 NOTE — PROGRESS NOTES
"[facility]    Medicine Progress Note - Hospitalist Service, GOLD TEAM 16    Date of Admission:  3/20/2024    Assessment & Plan   CAP with acute hypoxemic respiratory failure:  Presented with 1 week history of viral URI symptoms with rhinorrhea, nasal congestion and cough productive of yellow phlegm.  Patient is homeless and had been sleeping outside during that timeframe.  CXR showed bibasilar infiltrates consistent with infection, WBC 17.1 with slightly elevated procalcitonin.  VBG negative for CO2 retention.  LFTs, BMP and lactate normal.  Initially hypoxemic with temperature 100.8.  Started empirically on IV ceftriaxone and azithromycin.    Suspect viral URI with possible superimposed bacterial pneumonia.  Subsequently weaned off oxygen and oxygenating well on room air.  Blood cultures negative thus far.  Patient refused viral nasal swab, stating \"I do not want anybody shoving anything up into my brain\".  Continues to have cough productive of yellow phlegm, nasal drainage and congestion.  Temperature is much improved and continues to oxygenate well on room air.  Trace rales at the bases with minimal apical wheezing, exam otherwise unremarkable.  Patient refused labs 3/21, stating he did not like the look of the person who arrived to do it.  Patient now agreeable with having a viral panel if by oropharyngeal swab and agrees to have labs drawn in a.m. 3/22.  -Continue ceftriaxone and azithromycin  -Reattempt viral panel via oropharyngeal swab  -Check CBC, BMP 3/22    Diarrhea:  Patient reported intermittent diarrhea over the preceding 10 days PTA, but continue to eat and stay hydrated.  Suspect gastroenteritis possibly toxin mediated versus viral versus bacterial in the setting of food insecurity.  Diarrhea now resolved, having small, formed stools.  Eating well.  Trying to eat smaller meals to avoid nausea.    H/O Bipolar 1 D/O  Housing insecurity  Has not been on any " "medications PTA.  Has rather bizarre beliefs and opinions about healthcare and treatments.  Speech pressured but coherent.  Denies any hallucinations.      Diet: Combination Diet Regular Diet Adult    DVT Prophylaxis: Low Risk/Ambulatory with no VTE prophylaxis indicated  Rivas Catheter: Not present  Lines: None     Cardiac Monitoring: None  Code Status: Full Code      Clinically Significant Risk Factors          # Hypocalcemia: Lowest Ca = 8.3 mg/dL in last 2 days, will monitor and replace as appropriate     # Hypoalbuminemia: Lowest albumin = 3.3 g/dL at 3/20/2024  2:14 AM, will monitor as appropriate            # Obesity: Estimated body mass index is 33.97 kg/m  as calculated from the following:    Height as of this encounter: 1.88 m (6' 2\").    Weight as of this encounter: 120 kg (264 lb 8.8 oz)., PRESENT ON ADMISSION            Disposition Plan      Expected Discharge Date: 03/23/2024                    Silverio Cantor MD  Hospitalist Service, GOLD TEAM 16  [facility]  Securely message with [employer] (more info)  Text page via [facility] Paging/Directory   See signed in provider for up to date coverage information  ______________________________________________________________________    Interval History   Diarrhea resolved, having small formed stools.  Tolerating oral intake well, trying to eat smaller meals to avoid nausea.  Continues to have some chills, fevers improved.  Denies any dyspnea at rest or with exertion.  Weaned off oxygen overnight.  Continues to have sinus congestion and rhinorrhea, ears feel plugged up.  Continued cough but improving, yellow phlegm.  No hemoptysis.  Denies any pain.  No dysuria or urgency.    Physical Exam   Vital Signs: Temp: 99.4  F (37.4  C) Temp src: Oral BP: 133/83 Pulse: 98   Resp: 19 SpO2: 96 % O2 Device: None (Room air)    Weight: 264 lbs 8.83 oz  General: Alert and oriented x 4.  Speech pressured but coherent.  " "Pleasant.  HEENT: No icterus or injection.  EOM intact.  Neck supple, nontender.  Chest: Trace rales at the bases with minimal apical wheezing.  CV: RRR.  No murmurs.  Abdomen: NABS.  Soft, nontender, nondistended.  Extremities: No edema.  Neuro: Intact    60 MINUTES SPENT BY ME on the date of service doing chart review, history, exam, documentation & further activities per the note.      Data      CMP  Recent Labs   Lab 03/20/24  0732 03/20/24 0214    138   POTASSIUM 3.5 3.5   CHLORIDE 110* 104   CO2 24 22   ANIONGAP 10 12   * 148*   BUN 7.3* 10.6   CR 0.61* 0.73   GFRESTIMATED >90 >90   GIFTY 8.3* 8.4*   PROTTOTAL  --  6.2*   ALBUMIN  --  3.3*   BILITOTAL  --  0.6   ALKPHOS  --  76   AST  --  39   ALT  --  42     CBC  Recent Labs   Lab 03/20/24  0732 03/20/24 0214   WBC 16.4* 17.1*   RBC 3.93* 3.84*   HGB 12.3* 11.7*   HCT 37.7* 35.9*   MCV 96 94   MCH 31.3 30.5   MCHC 32.6 32.6   RDW 13.2 13.3    246     INRNo lab results found in last 7 days.  Arterial Blood Gas  Recent Labs   Lab 03/20/24 0214   O2PER 2   Venous Blood Gas  Recent Labs   Lab 03/20/24 0214   PHV 7.43   PCO2V 39*   PO2V 52*   HCO3V 26   AMARIS 1.2   O2PER 2   No results found for: \"A1C\", \"HEMOGLOBINA1\"  Results for orders placed or performed during the hospital encounter of 03/20/24   Chest XR,  PA & LAT    Narrative    EXAM: XR CHEST 2 VIEWS  LOCATION: [facility]  DATE: 3/20/2024    INDICATION: Cough. Fever.  COMPARISON: None.      Impression    IMPRESSION: Bibasilar infiltrates, more on the left, likely an infectious or an inflammatory process. No cavitation, suspicious adenopathy or pleural effusion on either side. Normal cardiac size and pulmonary vascularity. Degenerative changes both   shoulders and the spine. Monitoring leads overlying the chest. No prior study available for comparison. Current study will serve as a baseline for future follow-up.              "

## 2024-03-22 ENCOUNTER — HOSPITAL ENCOUNTER (EMERGENCY)
Facility: CLINIC | Age: 59
Discharge: NURSING, REHAB OR LONG-TERM CARE | End: 2024-03-22
Attending: STUDENT IN AN ORGANIZED HEALTH CARE EDUCATION/TRAINING PROGRAM | Admitting: STUDENT IN AN ORGANIZED HEALTH CARE EDUCATION/TRAINING PROGRAM
Payer: COMMERCIAL

## 2024-03-22 VITALS
SYSTOLIC BLOOD PRESSURE: 104 MMHG | BODY MASS INDEX: 32.47 KG/M2 | DIASTOLIC BLOOD PRESSURE: 66 MMHG | HEART RATE: 90 BPM | HEIGHT: 73 IN | WEIGHT: 245 LBS | RESPIRATION RATE: 18 BRPM | OXYGEN SATURATION: 98 % | TEMPERATURE: 98 F

## 2024-03-22 VITALS
OXYGEN SATURATION: 90 % | BODY MASS INDEX: 33.95 KG/M2 | HEART RATE: 89 BPM | RESPIRATION RATE: 17 BRPM | SYSTOLIC BLOOD PRESSURE: 95 MMHG | WEIGHT: 264.55 LBS | DIASTOLIC BLOOD PRESSURE: 58 MMHG | TEMPERATURE: 99.3 F | HEIGHT: 74 IN

## 2024-03-22 DIAGNOSIS — R19.5 LOOSE STOOLS: ICD-10-CM

## 2024-03-22 DIAGNOSIS — J18.9 COMMUNITY ACQUIRED PNEUMONIA, UNSPECIFIED LATERALITY: ICD-10-CM

## 2024-03-22 LAB
ALBUMIN SERPL BCG-MCNC: 3.4 G/DL (ref 3.5–5.2)
ALP SERPL-CCNC: 76 U/L (ref 40–150)
ALT SERPL W P-5'-P-CCNC: 42 U/L (ref 0–70)
ANION GAP SERPL CALCULATED.3IONS-SCNC: 10 MMOL/L (ref 7–15)
ANION GAP SERPL CALCULATED.3IONS-SCNC: 13 MMOL/L (ref 7–15)
AST SERPL W P-5'-P-CCNC: 24 U/L (ref 0–45)
BASOPHILS # BLD AUTO: 0.1 10E3/UL (ref 0–0.2)
BASOPHILS NFR BLD AUTO: 1 %
BILIRUB SERPL-MCNC: 0.2 MG/DL
BUN SERPL-MCNC: 10.3 MG/DL (ref 8–23)
BUN SERPL-MCNC: 16.1 MG/DL (ref 8–23)
CALCIUM SERPL-MCNC: 8.5 MG/DL (ref 8.8–10.2)
CALCIUM SERPL-MCNC: 9.1 MG/DL (ref 8.8–10.2)
CHLORIDE SERPL-SCNC: 104 MMOL/L (ref 98–107)
CHLORIDE SERPL-SCNC: 107 MMOL/L (ref 98–107)
CREAT SERPL-MCNC: 0.63 MG/DL (ref 0.67–1.17)
CREAT SERPL-MCNC: 0.64 MG/DL (ref 0.67–1.17)
DEPRECATED HCO3 PLAS-SCNC: 22 MMOL/L (ref 22–29)
DEPRECATED HCO3 PLAS-SCNC: 24 MMOL/L (ref 22–29)
EGFRCR SERPLBLD CKD-EPI 2021: >90 ML/MIN/1.73M2
EGFRCR SERPLBLD CKD-EPI 2021: >90 ML/MIN/1.73M2
EOSINOPHIL # BLD AUTO: 0.5 10E3/UL (ref 0–0.7)
EOSINOPHIL NFR BLD AUTO: 4 %
ERYTHROCYTE [DISTWIDTH] IN BLOOD BY AUTOMATED COUNT: 13.5 % (ref 10–15)
ERYTHROCYTE [DISTWIDTH] IN BLOOD BY AUTOMATED COUNT: 13.5 % (ref 10–15)
GLUCOSE SERPL-MCNC: 109 MG/DL (ref 70–99)
GLUCOSE SERPL-MCNC: 109 MG/DL (ref 70–99)
HCT VFR BLD AUTO: 39.2 % (ref 40–53)
HCT VFR BLD AUTO: 40.2 % (ref 40–53)
HGB BLD-MCNC: 12.9 G/DL (ref 13.3–17.7)
HGB BLD-MCNC: 13.2 G/DL (ref 13.3–17.7)
IMM GRANULOCYTES # BLD: 0.2 10E3/UL
IMM GRANULOCYTES NFR BLD: 1 %
LACTATE SERPL-SCNC: 0.7 MMOL/L (ref 0.7–2)
LYMPHOCYTES # BLD AUTO: 1.8 10E3/UL (ref 0.8–5.3)
LYMPHOCYTES NFR BLD AUTO: 18 %
MCH RBC QN AUTO: 31 PG (ref 26.5–33)
MCH RBC QN AUTO: 31.2 PG (ref 26.5–33)
MCHC RBC AUTO-ENTMCNC: 32.8 G/DL (ref 31.5–36.5)
MCHC RBC AUTO-ENTMCNC: 32.9 G/DL (ref 31.5–36.5)
MCV RBC AUTO: 94 FL (ref 78–100)
MCV RBC AUTO: 95 FL (ref 78–100)
MONOCYTES # BLD AUTO: 1 10E3/UL (ref 0–1.3)
MONOCYTES NFR BLD AUTO: 10 %
NEUTROPHILS # BLD AUTO: 6.8 10E3/UL (ref 1.6–8.3)
NEUTROPHILS NFR BLD AUTO: 66 %
NRBC # BLD AUTO: 0 10E3/UL
NRBC BLD AUTO-RTO: 0 /100
PLATELET # BLD AUTO: 336 10E3/UL (ref 150–450)
PLATELET # BLD AUTO: 370 10E3/UL (ref 150–450)
POTASSIUM SERPL-SCNC: 4.2 MMOL/L (ref 3.4–5.3)
POTASSIUM SERPL-SCNC: 4.3 MMOL/L (ref 3.4–5.3)
PROT SERPL-MCNC: 6.7 G/DL (ref 6.4–8.3)
RBC # BLD AUTO: 4.14 10E6/UL (ref 4.4–5.9)
RBC # BLD AUTO: 4.26 10E6/UL (ref 4.4–5.9)
SODIUM SERPL-SCNC: 139 MMOL/L (ref 135–145)
SODIUM SERPL-SCNC: 141 MMOL/L (ref 135–145)
WBC # BLD AUTO: 10.4 10E3/UL (ref 4–11)
WBC # BLD AUTO: 11.7 10E3/UL (ref 4–11)

## 2024-03-22 PROCEDURE — 80048 BASIC METABOLIC PNL TOTAL CA: CPT | Performed by: INTERNAL MEDICINE

## 2024-03-22 PROCEDURE — 99284 EMERGENCY DEPT VISIT MOD MDM: CPT

## 2024-03-22 PROCEDURE — 250N000011 HC RX IP 250 OP 636

## 2024-03-22 PROCEDURE — 83605 ASSAY OF LACTIC ACID: CPT | Performed by: INTERNAL MEDICINE

## 2024-03-22 PROCEDURE — 36415 COLL VENOUS BLD VENIPUNCTURE: CPT | Performed by: STUDENT IN AN ORGANIZED HEALTH CARE EDUCATION/TRAINING PROGRAM

## 2024-03-22 PROCEDURE — 250N000013 HC RX MED GY IP 250 OP 250 PS 637: Performed by: STUDENT IN AN ORGANIZED HEALTH CARE EDUCATION/TRAINING PROGRAM

## 2024-03-22 PROCEDURE — 36415 COLL VENOUS BLD VENIPUNCTURE: CPT | Performed by: INTERNAL MEDICINE

## 2024-03-22 PROCEDURE — 96375 TX/PRO/DX INJ NEW DRUG ADDON: CPT

## 2024-03-22 PROCEDURE — 250N000011 HC RX IP 250 OP 636: Performed by: EMERGENCY MEDICINE

## 2024-03-22 PROCEDURE — 250N000013 HC RX MED GY IP 250 OP 250 PS 637

## 2024-03-22 PROCEDURE — 99239 HOSP IP/OBS DSCHRG MGMT >30: CPT | Performed by: INTERNAL MEDICINE

## 2024-03-22 PROCEDURE — 93005 ELECTROCARDIOGRAM TRACING: CPT

## 2024-03-22 PROCEDURE — 96376 TX/PRO/DX INJ SAME DRUG ADON: CPT

## 2024-03-22 PROCEDURE — 258N000003 HC RX IP 258 OP 636: Performed by: EMERGENCY MEDICINE

## 2024-03-22 PROCEDURE — 85025 COMPLETE CBC W/AUTO DIFF WBC: CPT | Performed by: INTERNAL MEDICINE

## 2024-03-22 PROCEDURE — G0378 HOSPITAL OBSERVATION PER HR: HCPCS

## 2024-03-22 PROCEDURE — 85027 COMPLETE CBC AUTOMATED: CPT | Performed by: STUDENT IN AN ORGANIZED HEALTH CARE EDUCATION/TRAINING PROGRAM

## 2024-03-22 RX ORDER — CEFDINIR 300 MG/1
300 CAPSULE ORAL 2 TIMES DAILY
Qty: 14 CAPSULE | Refills: 0 | Status: SHIPPED | OUTPATIENT
Start: 2024-03-23

## 2024-03-22 RX ORDER — AZITHROMYCIN 500 MG/1
500 TABLET, FILM COATED ORAL DAILY
Qty: 2 TABLET | Refills: 0 | Status: SHIPPED | OUTPATIENT
Start: 2024-03-23

## 2024-03-22 RX ORDER — CEFDINIR 300 MG/1
300 CAPSULE ORAL ONCE
Status: COMPLETED | OUTPATIENT
Start: 2024-03-22 | End: 2024-03-22

## 2024-03-22 RX ORDER — AZITHROMYCIN 250 MG/1
500 TABLET, FILM COATED ORAL ONCE
Status: COMPLETED | OUTPATIENT
Start: 2024-03-22 | End: 2024-03-22

## 2024-03-22 RX ORDER — AZITHROMYCIN 500 MG/1
500 TABLET, FILM COATED ORAL DAILY
Qty: 2 TABLET | Refills: 0 | Status: SHIPPED | OUTPATIENT
Start: 2024-03-23 | End: 2024-03-22

## 2024-03-22 RX ORDER — CEFDINIR 300 MG/1
300 CAPSULE ORAL 2 TIMES DAILY
Qty: 14 CAPSULE | Refills: 0 | Status: SHIPPED | OUTPATIENT
Start: 2024-03-23 | End: 2024-03-22

## 2024-03-22 RX ADMIN — CEFTRIAXONE SODIUM 2 G: 2 INJECTION, POWDER, FOR SOLUTION INTRAMUSCULAR; INTRAVENOUS at 02:28

## 2024-03-22 RX ADMIN — ACETAMINOPHEN 650 MG: 325 TABLET, FILM COATED ORAL at 08:45

## 2024-03-22 RX ADMIN — CEFDINIR 300 MG: 300 CAPSULE ORAL at 18:03

## 2024-03-22 RX ADMIN — AZITHROMYCIN DIHYDRATE 500 MG: 250 TABLET ORAL at 18:03

## 2024-03-22 RX ADMIN — AZITHROMYCIN MONOHYDRATE 500 MG: 500 INJECTION, POWDER, LYOPHILIZED, FOR SOLUTION INTRAVENOUS at 04:03

## 2024-03-22 ASSESSMENT — ACTIVITIES OF DAILY LIVING (ADL)
ADLS_ACUITY_SCORE: 37
ADLS_ACUITY_SCORE: 39
ADLS_ACUITY_SCORE: 35
ADLS_ACUITY_SCORE: 39
ADLS_ACUITY_SCORE: 39
ADLS_ACUITY_SCORE: 37
ADLS_ACUITY_SCORE: 39
ADLS_ACUITY_SCORE: 35
ADLS_ACUITY_SCORE: 39

## 2024-03-22 ASSESSMENT — COLUMBIA-SUICIDE SEVERITY RATING SCALE - C-SSRS
1. IN THE PAST MONTH, HAVE YOU WISHED YOU WERE DEAD OR WISHED YOU COULD GO TO SLEEP AND NOT WAKE UP?: NO
2. HAVE YOU ACTUALLY HAD ANY THOUGHTS OF KILLING YOURSELF IN THE PAST MONTH?: NO
6. HAVE YOU EVER DONE ANYTHING, STARTED TO DO ANYTHING, OR PREPARED TO DO ANYTHING TO END YOUR LIFE?: NO

## 2024-03-22 NOTE — ED TRIAGE NOTES
[facility] the [facility] due to homelessness nad diarrhea. Patient has loose stools for the past 3 weeks due to orange juice. Patient has been seen recently at multiple hospitals.     Triage Assessment (Adult)       Row Name 03/22/24 1557          Triage Assessment    Airway WDL WDL        Respiratory WDL    Respiratory WDL WDL        Skin Circulation/Temperature WDL    Skin Circulation/Temperature WDL WDL        Cardiac WDL    Cardiac WDL WDL        Peripheral/Neurovascular WDL    Peripheral Neurovascular WDL WDL        Cognitive/Neuro/Behavioral WDL    Cognitive/Neuro/Behavioral WDL WDL

## 2024-03-22 NOTE — DISCHARGE SUMMARY
"[facility]  Hospitalist Discharge Summary      Date of Admission:  3/20/2024  Date of Discharge:  3/22/2024  Discharging Provider: Silverio Cantor MD  Discharge Service: Hospitalist Service, GOLD TEAM 16    Discharge Diagnoses   1.  Bibasilar community-acquired pneumonia, likely superimposed on underlying viral URI  2.  Possible recent gastroenteritis  3.  Suspect bipolar disorder as well as personality disorder    Clinically Significant Risk Factors     # Obesity: Estimated body mass index is 33.97 kg/m  as calculated from the following:    Height as of this encounter: 1.88 m (6' 2\").    Weight as of this encounter: 120 kg (264 lb 8.8 oz).       Follow-ups Needed After Discharge   Follow-up Appointments     Adult [facility] Follow-up and recommended labs and tests      Follow up with primary care provider, Merlene Fallon, within 7   days for hospital follow- up.  The following labs/tests are recommended:   CBC, BMP and repeat chest xray.      Appointments on [place] and/or [facility] (with [facility] or [facility]   provider or service). Call [phone] if you haven't heard regarding   these appointments within 7 days of discharge.            Unresulted Labs Ordered in the Past 30 Days of this Admission       Date and Time Order Name Status Description    3/20/2024  2:11 AM Blood Culture Hand, Right Preliminary     3/20/2024  2:11 AM Blood Culture Peripheral Blood Preliminary         These results will be followed up by Dr. Cantor    Discharge Disposition   Discharged to homeless shelter  Condition at discharge: Stable    Hospital Course   CAP with acute hypoxemic respiratory failure:  Presented with 1 week history of viral URI symptoms with rhinorrhea, nasal congestion and cough productive of yellow phlegm.  Patient is homeless and had been sleeping outside during that timeframe.  CXR showed bibasilar infiltrates consistent with infection, WBC 17.1 with " "slightly elevated procalcitonin.  VBG negative for CO2 retention.  LFTs, BMP and lactate normal.  Initially hypoxemic with temperature 100.8.  Started empirically on IV ceftriaxone and azithromycin.    Suspect viral URI with possible superimposed bacterial pneumonia.  Subsequently weaned off oxygen and oxygenating well on room air.  Blood cultures negative thus far.  Patient refused viral nasal swab, stating \"I do not want anybody shoving anything up into my brain\".  Pharyngeal swab ordered, the lab unable to accept that type of specimen.  The patient was much improved and had already been weaned off oxygen.  He was ambulating about the unit without dyspnea.  Leukocytosis much improved on day of discharge.   Unfortunately the patient became increasingly abusive towards nursing staff, as well as myself.  A DAVID had been called prior to my visit on the day of discharge.  I was alerted by nurses that he increasingly was making very racist and extremely abuse comments to staff, and was even refusing blood draws or any other type of care unless that person met his racial standards.    He exhibited the same behavior during my interview, and when I refused to take his side started directing them towards me.  He refused physical exam.  He did state that he was feeling much better, and clearly when observing him marching angrily about the unit he was not exhibiting any dyspnea.  His vital signs were stable and again his leukocytosis was much improved.  Due to his refusal to have a nasal swab, will were never able to diagnose for pneumonia.  I explained to the patient my treatment plan, including continue azithromycin for 2 more days starting tomorrow, and switching over to an oral cephalosporin for another several days.  He was agreeable with this.  I completed his discharge orders.  Hours later his medications had not arrived to the floor, patient became increasingly abusive towards staff.  I would defer to the staff note " regarding details.  Hopefully the patient will return to pharmacy and  his antibiotics.     Diarrhea:  Patient reported intermittent diarrhea over the preceding 10 days PTA, but continue to eat and stay hydrated.  Suspect gastroenteritis possibly toxin mediated versus viral versus bacterial in the setting of food insecurity.  Diarrhea now resolved, and as of 3/21 the patient was having small, formed stools.  He has been eating well.  H/O Bipolar 1 D/O  Housing insecurity  Has not been on any medications PTA.  Has rather bizarre beliefs and opinions about healthcare and treatments.  Speech pressured but coherent.  Denies any hallucinations.  Very abusive verbally, directing racist and derogatory comments to staff, extending to the point that the patient was even refusing blood draws or other care from hospital staff unless they met his racial standards.  I offered him psychiatric consultation, but he replied sarcastically and began reciting his philosophy on psychiatric care.  Although I believe the patient likely has bipolar disorder in addition to personality disorder, he has exhibited nothing to suggest psychosis and he expresses and demonstrates intention to seek appropriate treatment for his pneumonia.          Consultations This Hospital Stay   SOCIAL WORK IP CONSULT    Code Status   Full Code    Time Spent on this Encounter   I, Silverio Cantor MD, personally saw the patient today and spent greater than 30 minutes discharging this patient.       Silverio Cantor MD  [facility] MED SURG  [facility] [facility] [address]  Phone: [phone]  Fax: [phone]  ______________________________________________________________________    Physical Exam   Vital Signs: Temp: 99.3  F (37.4  C) Temp src: Oral BP: 95/58 Pulse: 89   Resp: 17 SpO2: 90 % O2 Device: None (Room air)    Weight: 264 lbs 8.83 oz  General: The patient was alert, appears to be fully oriented,  speech and thoughts.  Pressured but consistent for my interview yesterday, very aggressive and verbally abusive to both me and several other staff, using racist, vulgar and profane language, then expecting the victim to stand and listen to him explain why he is treating them that way.        Primary Care Physician   Merlene Fallon    Discharge Orders      Reason for your hospital stay    Bilateral lower lobe community acquired pneumonia.     Activity    Your activity upon discharge: activity as tolerated     Adult [facility] Follow-up and recommended labs and tests    Follow up with primary care provider, Merlene Fallon, within 7 days for hospital follow- up.  The following labs/tests are recommended: CBC, BMP and repeat chest xray.      Appointments on [place] and/or [facility] (with [facility] or [facility] provider or service). Call [phone] if you haven't heard regarding these appointments within 7 days of discharge.     Full Code     Diet    Follow this diet upon discharge: Regular Diet       Significant Results and Procedures   Most Recent 3 CBC's:  Recent Labs   Lab Test 03/22/24  0539 03/20/24  0732 03/20/24  0214   WBC 11.7* 16.4* 17.1*   HGB 12.9* 12.3* 11.7*   MCV 95 96 94    252 246     Most Recent 3 BMP's:  Recent Labs   Lab Test 03/22/24  0539 03/20/24  0732 03/20/24  0214    144 138   POTASSIUM 4.2 3.5 3.5   CHLORIDE 107 110* 104   CO2 24 24 22   BUN 10.3 7.3* 10.6   CR 0.63* 0.61* 0.73   ANIONGAP 10 10 12   GIFTY 8.5* 8.3* 8.4*   * 124* 148*     Most Recent 2 LFT's:  Recent Labs   Lab Test 03/20/24  0214 02/01/19  0234   AST 39 22   ALT 42 35   ALKPHOS 76 68   BILITOTAL 0.6 0.3   ,   Results for orders placed or performed during the hospital encounter of 03/20/24   Chest XR,  PA & LAT    Narrative    EXAM: XR CHEST 2 VIEWS  LOCATION: [facility]  DATE: 3/20/2024    INDICATION: Cough. Fever.  COMPARISON: None.       Impression    IMPRESSION: Bibasilar infiltrates, more on the left, likely an infectious or an inflammatory process. No cavitation, suspicious adenopathy or pleural effusion on either side. Normal cardiac size and pulmonary vascularity. Degenerative changes both   shoulders and the spine. Monitoring leads overlying the chest. No prior study available for comparison. Current study will serve as a baseline for future follow-up.       Discharge Medications   Current Discharge Medication List        START taking these medications    Details   azithromycin (ZITHROMAX) 500 MG tablet Take 1 tablet (500 mg) by mouth daily for 2 days  Qty: 2 tablet, Refills: 0    Associated Diagnoses: Community acquired pneumonia, unspecified laterality      cefdinir (OMNICEF) 300 MG capsule Take 1 capsule (300 mg) by mouth 2 times daily for 7 days  Qty: 14 capsule, Refills: 0    Associated Diagnoses: Community acquired pneumonia, unspecified laterality           CONTINUE these medications which have NOT CHANGED    Details   zinc oxide (DESITIN) 40 % paste Apply topically to affected area(s) 4 times daily as needed for Dry Skin.           STOP taking these medications       diphenoxylate-atropine (LOMOTIL) 2.5-0.025 MG tablet Comments:   Reason for Stopping:         ibuprofen (ADVIL/MOTRIN) 600 MG tablet Comments:   Reason for Stopping:         loperamide (IMODIUM A-D) 2 MG tablet Comments:   Reason for Stopping:             Allergies   No Known Allergies

## 2024-03-22 NOTE — PLAN OF CARE
Problem: Adult Inpatient Plan of Care  Goal: Absence of Hospital-Acquired Illness or Injury  Intervention: Prevent Infection  Recent Flowsheet Documentation  Taken 3/22/2024 0321 by Neo Cr RN  Infection Prevention: hand hygiene promoted     Problem: Gas Exchange Impaired  Goal: Optimal Gas Exchange  Outcome: Progressing     Problem: Pneumonia  Goal: Fluid Balance  Outcome: Progressing  Goal: Resolution of Infection Signs and Symptoms  Outcome: Not Progressing  Goal: Effective Oxygenation and Ventilation  Outcome: Progressing    VS:  Temp: 99.3  F (37.4  C) Temp src: Oral BP: 95/58 Pulse: 89   Resp: 17 SpO2: 90 % O2 Device: None (Room air)       O2: SpO2 > 90 and stable on RA. With rales.   Denies chest pain and SOB.  + cough - infrequent    Output: Voids spontaneously without difficulty to bathroom / using beside urinal.    Last BM: Continent  BS active / passing flatus.    Activity:  independent   Skin:  Not fully assessed (patient does not want to turn on the lights)   Pain:  0/10    CMS: Intact, AOx4.  Kept telling the RN that he does not understand the accent   Dressing:  none   Diet: Combination regular diet.    LDA: R PIV SL   Equipment: IV pole, personal belongings, monitor   Plan: SPECIAL precautions maintained / Continue with plan of care. Call light within reach, pt able to make needs known.   Plan: antibiotic   Additional Info:  Refused tele   Refused continuous pulse ox (patient stated that he got angry yesterday because the nurse put the pulse ox on him)   Refused nasal swab for the respiratory panel      0250 patient questioning the country where the RN came from. Patient kept telling he does not understand the accent of the RN     0315 patient complained of nasal bleeding. RN saw minimal blood in the kleenex in the trash can. Patient was not fully assessed as he does not want to turn on the lights. MD aware; with normal V/S. RN suggested to the patient if he could get nasal spray from  the doctor but patient answered back that he does not like it.        0400 gave lip balm

## 2024-03-22 NOTE — PROGRESS NOTES
Patient was up at  harassing staff members. He was making extremely racist remarks to all  and [place] Staff at the desk. Patient was awaiting discharge instructions and medications(two antibiotics)from [facility], however he became so verbally aggressive that Security was called to escort him out of the hospital. All belongings went with patient.

## 2024-03-22 NOTE — PROGRESS NOTES
"S: Patient exibiting statements of racism to nursing staff.     B: Patient admitted to Adult 6 MS on 10 pm.   A: Patient accepted vital signs checks on admission.  Declined all other assessment from admitting RN. Pt refused all cars from staff the following day shift. Asked staff to leave and shut his door.   Day 3 and day 4 of patient stay showed same behavior. Patient has history of bipolar disorders and is admitted currently for CAP, on observation status.  RN caring for patient on the day shift reports patient informed him he does not want any non  providers, lab staff, or nurse caring for him. Pt up to the unit desk, upon seeing  [facility], addressed HUC by stating \"I don't like people that look like you\". Upon seeing a staff walk by with a  hijab, patient stated \" I don't like seeing these sub humans\", pointing in the direction of staff wearing a hijab.  Patient placing multiple calls to patient relations. Call received from patient relations notifying staff that his calls will not be answered as the had no  valid reason for placing calls.   Patient up to nursing station for the second time, coming into [facility] face, using derogatory remarks to staff, \"I bet you will be in the same place for the next several years\" pointed towards [place] staff standing by desk and stated \" I guess Sowmya is better off because of you-not\". Nurse manager stepped in attempting to answer patient's question and attempt to redirect patient, patient interrupted NM asking \"who are you? I bet you are not intellectual enough to answer my question.   R: Patient currently has  staff caring for him. Has allowed cares. Duress button pulled when patient was up at the nursing station in [facility] face. Patient walked back to his room after speaking  another  staff.  Provider notified. Ask to meet with Nurse Leader to set boundaries with patient until he discharges.   R: Call back received from provider. Provider is " coming down to discharge patient. Staff will notify Security dispatch if patient comes back to the Nursing desk and show negative interaction with staff or starts to escalate.

## 2024-03-22 NOTE — ED PROVIDER NOTES
"  History     Chief Complaint:  Homeless and Diarrhea     The history is provided by the patient.      Terry Carmona is a 58 year old male with history of bipolar disorder and housing instability who presents with productive cough, fever, chills, and some sore throat, present for some time. He was admitted to [facility] 3/20-3/22 for pneumonia. He reports he was discharged because of personal disagreements with staff, including becoming upset with nurses who woke him from sleep to take blood. He was prescribed antibiotics at discharge, but states he could not pick them up because it was taking too long, and he needed to find a place to stay for the night. Today, he reports his symptoms have not improved and he is feeling very fatigued. He came to [facility] because he believes it is \"[place],\" and believes he will receive better care here. Patient denies chest pain, shortness of breath, or vomiting. He notes he developed loose stools a few weeks ago after drinking orange juice and guacamole. No history of myocardial infarction or blood clots.  He denies SI or HI.  He lists multiple homeless shelters where he refuses to go and stay due to \"racial differences.\"  Repeats multiple times \" I am not racist, I am ethno-centric.\"    He has never used an inhaler because he does not like to put \"synthetic substances\" in his body.  He is a smoker \"on and off.\"  He had 2 cigarettes today.    Independent Historian:   None - Patient Only    Review of External Notes:   Reviewed stool samples from 3/9/24, negative for c. diff.     I reviewed the patient's discharge summary from today at [facility].  He had bilateral pneumonia with leukocytosis and elevated procalcitonin.  \"Patient refused viral nasal swab, stating \"I do not want anybody shoving anything up into my brain\". \"  The patient improved over a couple of days and was weaned off oxygen.  Dr. Cantor's note:  \"Unfortunately " "the patient became increasingly abusive towards nursing staff, as well as myself.  A DAVID had been called prior to my visit on the day of discharge.  I was alerted by nurses that he increasingly was making very racist and extremely abuse comments to staff, and was even refusing blood draws or any other type of care unless that person met his racial standards. He exhibited the same behavior during my interview, and when I refused to take his side started directing them towards me. He refused physical exam. He did state that he was feeling much better, and clearly when observing him marching angrily about the unit he was not exhibiting any dyspnea.  \"      Medications:    The patient is currently on no regular medications.     Past Medical History:    Bipolar disorder   Asthma    Past Surgical History:    Tonsillectomy     Physical Exam   Patient Vitals for the past 24 hrs:   BP Temp Temp src Pulse Resp SpO2 Height Weight   03/22/24 1559 104/66 98  F (36.7  C) Tympanic 90 18 98 % 1.854 m (6' 1\") 111.1 kg (245 lb)        Physical Exam  Vital signs and nursing notes reviewed.    General:  Alert and oriented. Resting on bed with his eyes covered with his mcdonald and sunglasses.  Skin: Skin is warm and dry. No diaphoresis.  HEENT:   Head: Normocephalic, atraumatic. Facial features symmetric.   Eyes: Conjunctiva pink, sclera white. EOMs grossly intact.   Ears: Auricles without lesion, erythema, or edema.  Bilateral tympanic membranes without erythema or bulging.  Nose: Symmetric with no discharge.  Mouth and throat: Lips are moist with no lesions or edema, Buccal and oropharyngeal mucosa is pink and moist without lesions or exudate. Uvula is midline.  Neck: Normal range of motion. Neck supple with no lymphadenopathy.   CV:  Heart RRR with no murmurs or extra heart sounds. 2+ radial and tibialis posterior pulses bilaterally. No peripheral edema.  Pulm/Chest: Chest wall expansion symmetric with no increased effort of breathing. " Lungs clear and equal to auscultation bilaterally.  No wheezing or crackles.  Abd: Abdomen is soft and nontender to palpation in all 4 quadrants with no guarding or rebound.   M/S: Moves all extremities spontaneously.  Psych: Speech pressured and tangential but coherent.  Denies hallucinations.  Follows commands.    Emergency Department Course   ECG:  ECG results from 03/22/24   EKG 12 lead     Value    Systolic Blood Pressure     Diastolic Blood Pressure     Ventricular Rate 102    Atrial Rate 102    NY Interval 134    QRS Duration 82        QTc 419    P Axis 74    R AXIS 43    T Axis 36    Interpretation ECG      Sinus tachycardia with Premature atrial complexes with Aberrant conduction  Low voltage QRS  Borderline ECG       Imaging:  No orders to display      Laboratory:  Labs Ordered and Resulted from Time of ED Arrival to Time of ED Departure   COMPREHENSIVE METABOLIC PANEL - Abnormal       Result Value    Sodium 139      Potassium 4.3      Carbon Dioxide (CO2) 22      Anion Gap 13      Urea Nitrogen 16.1      Creatinine 0.64 (*)     GFR Estimate >90      Calcium 9.1      Chloride 104      Glucose 109 (*)     Alkaline Phosphatase 76      AST 24      ALT 42      Protein Total 6.7      Albumin 3.4 (*)     Bilirubin Total 0.2     CBC WITH PLATELETS AND DIFFERENTIAL - Abnormal    WBC Count 10.4      RBC Count 4.26 (*)     Hemoglobin 13.2 (*)     Hematocrit 40.2      MCV 94      MCH 31.0      MCHC 32.8      RDW 13.5      Platelet Count 370      % Neutrophils 66      % Lymphocytes 18      % Monocytes 10      % Eosinophils 4      % Basophils 1      % Immature Granulocytes 1      NRBCs per 100 WBC 0      Absolute Neutrophils 6.8      Absolute Lymphocytes 1.8      Absolute Monocytes 1.0      Absolute Eosinophils 0.5      Absolute Basophils 0.1      Absolute Immature Granulocytes 0.2      Absolute NRBCs 0.0         Procedures   None    Emergency Department Course & Assessments:       Interventions:  Medications    cefdinir (OMNICEF) capsule 300 mg (has no administration in time range)   azithromycin (ZITHROMAX) tablet 500 mg (has no administration in time range)   sodium chloride 0.9% BOLUS 500 mL (500 mLs Intravenous Not Given 3/22/24 1726)      Independent Interpretation (X-rays, CTs, rhythm strip):  None    Assessments/Consultations/Discussion of Management or Tests:  ED Course as of 03/22/24 2321   Fri Mar 22, 2024   1655 I obtained the history and examined the patient as noted above.    1743 I rechecked and updated the patient.   1830 I called [facility] hotline to try and obtain patient to shelter bed.  They are closed until 730.  We will try calling again at that time.     1930 Nursing staff called [facility].  There is a bed available observation [facility].  The patient does not want to go to the [facility] due to racial concerns.   1946 I spoke with [facility] regarding shelter bed for the patient.  The reservations are full for tonight.     Social Determinants of Health affecting care:   None    Disposition:  The patient was discharged to home.     Impression & Plan    CMS Diagnoses: None    MIPS (If applicable):  N/A    Medical Decision Making:  Terry Carmona is a 58 year old male with a history of homelessness and bipolar disorder who presents for evaluation of cough and fatigue.  He was just discharged from [facility] for pneumonia earlier today.  See HPI.  Vital signs normal.  On exam, he is nontoxic and not hypoxic.  He is neurologically intact without evidence of acute psychosis.  His speech is pressured yet coherent.  He denies SI or HI.  His abdomen is benign and stools have been improving.  Low suspicion for sinister intra-abdominal pathology and no advanced imaging will be ordered today.  C. difficile was negative upon previous lab testing.  His lungs are clear to auscultation without wheezing or  crackles but does have occasional cough.  Did not feel repeat chest x-ray is indicated at this time.  He reports ongoing fatigue, EKG was obtained, and is fortunately negative for sinister arrhythmia or evidence of ischemia.  CBC shows anemia improved from earlier today, and no leukocytosis.  CMP is without significant electrolyte, metabolic, renal, or hepatic abnormalities.  He was able to ambulate in the hallway remaining and 96% the entire time.  He had no shortness of breath, chest pain, or dizziness.  He received a dose of his antibiotics prescribed for his pneumonia here in the ED.  Using reasonable clinical judgment, he is safe for discharge medically.  He is nontoxic, not hypoxic, and is tolerating oral intake.  Myself and other staff members spent some time on the phone coordinating [facility] shelter for him to discharge to.  He initially refused to go to [facility] due to previous issues, however, ultimately was agreeable.  I was able to obtain his outpatient antibiotics and he was sent home with these.  He is instructed to return to an ED should he develop chest pain or shortness of breath, persistently high fevers, or further emergent concerns.  He discharged from the department in well appearance.      Diagnosis:    ICD-10-CM    1. Community acquired pneumonia, unspecified laterality  J18.9 azithromycin (ZITHROMAX) 500 MG tablet     cefdinir (OMNICEF) 300 MG capsule      2. Loose stools  R19.5            Discharge Medications:  Current Discharge Medication List           Scribe Disclosure:  BERENICE, Maira Rosenthal, am serving as a scribe at 5:00 PM on 3/22/2024 to document services personally performed by Cristy Bowen PA-C based on my observations and the provider's statements to me.     3/22/2024   Crsity Bowen PA-C Victoria J. DAVY Bowen on 3/22/2024 at 11:35 PM             Cristy Bowen PA-C  03/22/24 8157

## 2024-03-22 NOTE — DISCHARGE INSTRUCTIONS
Take your antibiotics as prescribed  Refrain from smoking  Follow up with your primary care provider for recheck next week  Return to an ED should you develop chest pain, shortness of breath, fevers, persistent vomiting, blood in your stool, or any further emergent concerns  I hope you feel better soon!  Discharge Instructions  Bronchitis, Pneumonia, Bronchospasm    You were seen today for a chest infection or inflammation. If your provider decided this was due to a bacterial infection, you may need an antibiotic. Sometimes these are caused by a virus, and then an antibiotic will not help.     Generally, every Emergency Department visit should have a follow-up clinic visit with either a primary or a specialty clinic/provider. Please follow-up as instructed by your emergency provider today.    Return to the Emergency Department if:  Your breathing gets much worse.  You are very weak, or feel much more ill.  You develop new symptoms, such as chest pain.  You cough up blood.  You are vomiting (throwing up) enough that you cannot keep fluids or your medicine down.    What can I do to help myself?  Fill any prescriptions the provider gave you and take them right away--especially antibiotics. Be sure to finish the whole antibiotic prescription.  You may be given a prescription for an inhaler, which can help loosen tight air passages.  Use this as needed, but not more often than directed. Inhalers work much better when used with a spacer.   You may be given a prescription for a steroid to reduce inflammation. Used long-term, these can have side effects, but for short-term use they are safe. You may notice restlessness or increased appetite.      You may use non-prescription cough or cold medicines. Cough medicines may help, but don t make the cough go away completely.   Avoid smoke, because this can make your symptoms worse. If you smoke, this may be a good time to quit! Consider using nicotine lozenges, gum, or patches to  reduce cravings.   If you have a fever, Tylenol  (acetaminophen), Motrin  (ibuprofen), or Advil  (ibuprofen) may help bring fever down and may help you feel more comfortable. Be sure to read and follow the package directions, and ask your provider if you have questions.  Be sure to get your flu shot each year.  For certain ages, the pneumonia shot can help prevent pneumonia.  If you were given a prescription for medicine here today, be sure to read all of the information (including the package insert) that comes with your prescription.  This will include important information about the medicine, its side effects, and any warnings that you need to know about.  The pharmacist who fills the prescription can provide more information and answer questions you may have about the medicine.  If you have questions or concerns that the pharmacist cannot address, please call or return to the Emergency Department.     Remember that you can always come back to the Emergency Department if you are not able to see your regular provider in the amount of time listed above, if you get any new symptoms, or if there is anything that worries you.

## 2024-03-22 NOTE — PROGRESS NOTES
"Patient turned on call light asking for Tylenol. When I entered his room he said to me \"great! I'm glad to finally see a white person enter my room.\" I did not respond but proceeded with giving him his Tylenol and left the room.  "

## 2024-03-23 LAB
ATRIAL RATE - MUSE: 102 BPM
DIASTOLIC BLOOD PRESSURE - MUSE: NORMAL MMHG
INTERPRETATION ECG - MUSE: NORMAL
P AXIS - MUSE: 74 DEGREES
PR INTERVAL - MUSE: 134 MS
QRS DURATION - MUSE: 82 MS
QT - MUSE: 322 MS
QTC - MUSE: 419 MS
R AXIS - MUSE: 43 DEGREES
SYSTOLIC BLOOD PRESSURE - MUSE: NORMAL MMHG
T AXIS - MUSE: 36 DEGREES
VENTRICULAR RATE- MUSE: 102 BPM

## 2024-03-25 LAB
BACTERIA BLD CULT: NO GROWTH
BACTERIA BLD CULT: NO GROWTH

## 2024-03-25 NOTE — PROGRESS NOTES
"S: Security up to unit to intervene/prevent patient from asserting racial slurs towards Non  staff.   A : Patient allowed  Charge RN to remove his IV. Charge RN concurred to  antibiotics from discharge Pharmacy.  Patient informed that discharge oral antibiotics were being picked up from the pharmacy,encouraged to await medications prior to discharging.   R: Patient voiced to staff, \" I do not want the medications, I do not trust any of you\". I will just go to another Emergency Department. Patient went down elevators escorted from the unit by Security.   "

## 2024-03-30 ENCOUNTER — APPOINTMENT (OUTPATIENT)
Dept: GENERAL RADIOLOGY | Facility: CLINIC | Age: 59
End: 2024-03-30
Attending: EMERGENCY MEDICINE
Payer: COMMERCIAL

## 2024-03-30 ENCOUNTER — HOSPITAL ENCOUNTER (EMERGENCY)
Facility: CLINIC | Age: 59
Discharge: HOME | End: 2024-03-30
Attending: EMERGENCY MEDICINE | Admitting: EMERGENCY MEDICINE
Payer: COMMERCIAL

## 2024-03-30 VITALS
OXYGEN SATURATION: 93 % | HEART RATE: 91 BPM | RESPIRATION RATE: 16 BRPM | DIASTOLIC BLOOD PRESSURE: 64 MMHG | SYSTOLIC BLOOD PRESSURE: 139 MMHG | TEMPERATURE: 97.7 F

## 2024-03-30 VITALS
DIASTOLIC BLOOD PRESSURE: 70 MMHG | RESPIRATION RATE: 16 BRPM | HEART RATE: 95 BPM | BODY MASS INDEX: 32.37 KG/M2 | TEMPERATURE: 97.7 F | OXYGEN SATURATION: 97 % | HEIGHT: 72 IN | SYSTOLIC BLOOD PRESSURE: 123 MMHG | WEIGHT: 239 LBS

## 2024-03-30 DIAGNOSIS — R19.7 DIARRHEA, UNSPECIFIED TYPE: ICD-10-CM

## 2024-03-30 DIAGNOSIS — Z59.00 HOMELESSNESS: ICD-10-CM

## 2024-03-30 DIAGNOSIS — J18.9 PNEUMONIA DUE TO INFECTIOUS ORGANISM, UNSPECIFIED LATERALITY, UNSPECIFIED PART OF LUNG: ICD-10-CM

## 2024-03-30 DIAGNOSIS — R05.1 ACUTE COUGH: ICD-10-CM

## 2024-03-30 PROBLEM — R51.9 CHRONIC HEADACHES: Status: ACTIVE | Noted: 2024-03-19

## 2024-03-30 PROBLEM — R60.0 EDEMA, LOWER EXTREMITY: Status: ACTIVE | Noted: 2019-02-05

## 2024-03-30 PROBLEM — G89.29 CHRONIC HEADACHES: Status: ACTIVE | Noted: 2024-03-19

## 2024-03-30 LAB
ALBUMIN SERPL BCG-MCNC: 3.5 G/DL (ref 3.5–5.2)
ALP SERPL-CCNC: 78 U/L (ref 40–150)
ALT SERPL W P-5'-P-CCNC: 30 U/L (ref 0–70)
ANION GAP SERPL CALCULATED.3IONS-SCNC: 7 MMOL/L (ref 7–15)
ANION GAP SERPL CALCULATED.3IONS-SCNC: 8 MMOL/L (ref 7–15)
AST SERPL W P-5'-P-CCNC: 27 U/L (ref 0–45)
BASOPHILS # BLD AUTO: 0.1 10E3/UL (ref 0–0.2)
BASOPHILS NFR BLD AUTO: 1 %
BILIRUB SERPL-MCNC: 0.2 MG/DL
BUN SERPL-MCNC: 9.7 MG/DL (ref 8–23)
BUN SERPL-MCNC: 9.9 MG/DL (ref 8–23)
CALCIUM SERPL-MCNC: 8.4 MG/DL (ref 8.8–10.2)
CALCIUM SERPL-MCNC: 8.6 MG/DL (ref 8.8–10.2)
CHLORIDE SERPL-SCNC: 103 MMOL/L (ref 98–107)
CHLORIDE SERPL-SCNC: 109 MMOL/L (ref 98–107)
CREAT SERPL-MCNC: 0.72 MG/DL (ref 0.67–1.17)
CREAT SERPL-MCNC: 0.73 MG/DL (ref 0.67–1.17)
DEPRECATED HCO3 PLAS-SCNC: 27 MMOL/L (ref 22–29)
DEPRECATED HCO3 PLAS-SCNC: 27 MMOL/L (ref 22–29)
EGFRCR SERPLBLD CKD-EPI 2021: >90 ML/MIN/1.73M2
EGFRCR SERPLBLD CKD-EPI 2021: >90 ML/MIN/1.73M2
EOSINOPHIL # BLD AUTO: 0.3 10E3/UL (ref 0–0.7)
EOSINOPHIL NFR BLD AUTO: 4 %
ERYTHROCYTE [DISTWIDTH] IN BLOOD BY AUTOMATED COUNT: 13.8 % (ref 10–15)
ERYTHROCYTE [DISTWIDTH] IN BLOOD BY AUTOMATED COUNT: 13.8 % (ref 10–15)
GLUCOSE SERPL-MCNC: 89 MG/DL (ref 70–99)
GLUCOSE SERPL-MCNC: 96 MG/DL (ref 70–99)
HCT VFR BLD AUTO: 42 % (ref 40–53)
HCT VFR BLD AUTO: 42.7 % (ref 40–53)
HGB BLD-MCNC: 13.5 G/DL (ref 13.3–17.7)
HGB BLD-MCNC: 13.8 G/DL (ref 13.3–17.7)
IMM GRANULOCYTES # BLD: 0.1 10E3/UL
IMM GRANULOCYTES NFR BLD: 1 %
LYMPHOCYTES # BLD AUTO: 1.2 10E3/UL (ref 0.8–5.3)
LYMPHOCYTES NFR BLD AUTO: 16 %
MCH RBC QN AUTO: 30.6 PG (ref 26.5–33)
MCH RBC QN AUTO: 30.7 PG (ref 26.5–33)
MCHC RBC AUTO-ENTMCNC: 32.1 G/DL (ref 31.5–36.5)
MCHC RBC AUTO-ENTMCNC: 32.3 G/DL (ref 31.5–36.5)
MCV RBC AUTO: 95 FL (ref 78–100)
MCV RBC AUTO: 95 FL (ref 78–100)
MONOCYTES # BLD AUTO: 0.9 10E3/UL (ref 0–1.3)
MONOCYTES NFR BLD AUTO: 11 %
NEUTROPHILS # BLD AUTO: 5.4 10E3/UL (ref 1.6–8.3)
NEUTROPHILS NFR BLD AUTO: 67 %
NRBC # BLD AUTO: 0 10E3/UL
NRBC BLD AUTO-RTO: 0 /100
PLATELET # BLD AUTO: 341 10E3/UL (ref 150–450)
PLATELET # BLD AUTO: 349 10E3/UL (ref 150–450)
POTASSIUM SERPL-SCNC: 3.6 MMOL/L (ref 3.4–5.3)
POTASSIUM SERPL-SCNC: 3.9 MMOL/L (ref 3.4–5.3)
PROT SERPL-MCNC: 6.2 G/DL (ref 6.4–8.3)
RBC # BLD AUTO: 4.41 10E6/UL (ref 4.4–5.9)
RBC # BLD AUTO: 4.49 10E6/UL (ref 4.4–5.9)
SODIUM SERPL-SCNC: 137 MMOL/L (ref 135–145)
SODIUM SERPL-SCNC: 144 MMOL/L (ref 135–145)
WBC # BLD AUTO: 6.1 10E3/UL (ref 4–11)
WBC # BLD AUTO: 7.9 10E3/UL (ref 4–11)

## 2024-03-30 PROCEDURE — 96360 HYDRATION IV INFUSION INIT: CPT | Performed by: EMERGENCY MEDICINE

## 2024-03-30 PROCEDURE — 85025 COMPLETE CBC W/AUTO DIFF WBC: CPT | Performed by: EMERGENCY MEDICINE

## 2024-03-30 PROCEDURE — 99283 EMERGENCY DEPT VISIT LOW MDM: CPT

## 2024-03-30 PROCEDURE — 71046 X-RAY EXAM CHEST 2 VIEWS: CPT

## 2024-03-30 PROCEDURE — 36415 COLL VENOUS BLD VENIPUNCTURE: CPT | Performed by: EMERGENCY MEDICINE

## 2024-03-30 PROCEDURE — 85027 COMPLETE CBC AUTOMATED: CPT | Performed by: EMERGENCY MEDICINE

## 2024-03-30 PROCEDURE — 99284 EMERGENCY DEPT VISIT MOD MDM: CPT | Performed by: EMERGENCY MEDICINE

## 2024-03-30 PROCEDURE — 99284 EMERGENCY DEPT VISIT MOD MDM: CPT | Mod: 25 | Performed by: EMERGENCY MEDICINE

## 2024-03-30 PROCEDURE — 258N000003 HC RX IP 258 OP 636: Performed by: EMERGENCY MEDICINE

## 2024-03-30 PROCEDURE — 82040 ASSAY OF SERUM ALBUMIN: CPT | Performed by: EMERGENCY MEDICINE

## 2024-03-30 RX ORDER — LOPERAMIDE HYDROCHLORIDE 2 MG/1
2 TABLET ORAL 4 TIMES DAILY PRN
Qty: 20 TABLET | Refills: 0 | Status: SHIPPED | OUTPATIENT
Start: 2024-03-30

## 2024-03-30 RX ORDER — PREDNISONE 20 MG/1
40 TABLET ORAL DAILY
Qty: 8 TABLET | Refills: 0 | Status: SHIPPED | OUTPATIENT
Start: 2024-03-30 | End: 2024-04-03

## 2024-03-30 RX ADMIN — SODIUM CHLORIDE 1000 ML: 9 INJECTION, SOLUTION INTRAVENOUS at 03:23

## 2024-03-30 SDOH — ECONOMIC STABILITY - HOUSING INSECURITY: HOMELESSNESS UNSPECIFIED: Z59.00

## 2024-03-30 ASSESSMENT — COLUMBIA-SUICIDE SEVERITY RATING SCALE - C-SSRS
6. HAVE YOU EVER DONE ANYTHING, STARTED TO DO ANYTHING, OR PREPARED TO DO ANYTHING TO END YOUR LIFE?: NO
2. HAVE YOU ACTUALLY HAD ANY THOUGHTS OF KILLING YOURSELF IN THE PAST MONTH?: NO
1. IN THE PAST MONTH, HAVE YOU WISHED YOU WERE DEAD OR WISHED YOU COULD GO TO SLEEP AND NOT WAKE UP?: NO

## 2024-03-30 ASSESSMENT — ACTIVITIES OF DAILY LIVING (ADL)
ADLS_ACUITY_SCORE: 37

## 2024-03-30 NOTE — DISCHARGE INSTRUCTIONS
Instructions from your doctor today:  Emergency Department (ED) testing is focused on the potential causes of your symptoms that are the most dangerous possibilities, and cannot cover every possibility. Based on the evaluation, it was deemed sufficiently safe to discharge and continue management through the clinics. Thus, follow-up is very important to assess for improvement/worsening, potential further testing, and potential treatment adjustments. If you were given opioid pain medications or other medications that can make you drowsy while in the ED, you should not drive for at least several hours and not until you feel completely back to normal.     Please make an appointment to follow up with:  - Your Primary Care Provider in 5 days  - If you do not have a primary care provider, you can be seen in follow-up and establish care by calling any of the clinics below:     - [facility] (phone: [phone])     - Primary Care / [facility] (phone: [phone])   - Have your clinic provider review the results from today's visit with you again, including any potential follow-up or additional testing that may be needed based on the results. Occasionally, incidental findings are found on later review by radiologists that may need follow-up.     Return to the Emergency Department immediately if you have worsening symptoms, or any other urgent health concerns.

## 2024-03-30 NOTE — PROGRESS NOTES
Care Management Follow Up    Length of Stay (days): 0    Expected Discharge Date:       Concerns to be Addressed:  wants transporttsion to [facility])     Patient plan of care discussed at interdisciplinary rounds: Yes    Anticipated Discharge Disposition: Homeless     Anticipated Discharge Services: Transportation Services    Anticipated Discharge DME: None     Education Provided on the Discharge Plan: Yes (AVS per bedside RN)    Patient/Family in Agreement with the Plan: yes      Additional Information:  CM reviewed chart. CM met with patient.He refused to let CM turn the light on. Laid in the bed with his mcdonald over his head. He would like transportation to the [facility] on [address]. He wants to go to the library in [place]. He said it's [address]. A cab can be arranged to take him to this address. [facility] [address], [place] [address].         Ita Weldon RN

## 2024-03-30 NOTE — ED NOTES
Pt refused discharged vitals. Pt was walked out to the lobby in a wheelchair. Dressed well for the weather. Security aware pt is waiting for a taxi ride.

## 2024-03-30 NOTE — ED PROVIDER NOTES
"  History     Chief Complaint   Patient presents with    Cough    Homeless     HPI  Terry Carmona is a 58 year old male with PMH notable for community acquired pneumonia 3/20/2024, homelessness, bipolar I, asthma  who presents to the ED with cough.  Patient reports having pneumonia recently.  Cough is continued.  He does have his antibiotics with him.  Patient reports he has had difficulty dealing with his cough while being homeless, has complaints about \"multiculturalism\" in the shelters.  No chest pain, no fevers today.  Patient notes a couple episodes of diarrhea that started tonight, had similar about a week ago which previously had resolved.  No bloody nor black stool, no vomiting.    Physical Exam   BP: 139/64  Pulse: 91  Temp: 97.7  F (36.5  C)  Resp: 16  SpO2: 97 %    Physical Exam  General: no acute distress. Appears stated age.   HENT: MMM, no oropharyngeal lesions  Eyes: PERRL, normal sclerae   Cardio: Regular rate. Regular rhythm. Extremities well perfused  Resp: Normal work of breathing, Normal respiratory rate. Clear to auscultation bilaterally.  Abdomen: no tenderness, non-distended, no rebound, no guarding  Neuro: alert without signs of confusion. CN II-XII grossly intact. Grossly normal strength and sensation in all extremities.   Psych: normal affect, normal behavior.  Tangential      ED Course      Procedures              Labs Ordered and Resulted from Time of ED Arrival to Time of ED Departure   COMPREHENSIVE METABOLIC PANEL - Abnormal       Result Value    Sodium 137      Potassium 3.6      Carbon Dioxide (CO2) 27      Anion Gap 7      Urea Nitrogen 9.9      Creatinine 0.72      GFR Estimate >90      Calcium 8.4 (*)     Chloride 103      Glucose 96      Alkaline Phosphatase 78      AST 27      ALT 30      Protein Total 6.2 (*)     Albumin 3.5      Bilirubin Total 0.2     CBC WITH PLATELETS AND DIFFERENTIAL    WBC Count 7.9      RBC Count 4.41      Hemoglobin 13.5      Hematocrit 42.0      MCV 95 "      MCH 30.6      MCHC 32.1      RDW 13.8      Platelet Count 341      % Neutrophils 67      % Lymphocytes 16      % Monocytes 11      % Eosinophils 4      % Basophils 1      % Immature Granulocytes 1      NRBCs per 100 WBC 0      Absolute Neutrophils 5.4      Absolute Lymphocytes 1.2      Absolute Monocytes 0.9      Absolute Eosinophils 0.3      Absolute Basophils 0.1      Absolute Immature Granulocytes 0.1      Absolute NRBCs 0.0       Chest XR,  PA & LAT   Final Result   IMPRESSION:  Mild stable elevation right hemidiaphragm. Bibasilar atelectasis or infiltrates stable. Heart size and pulmonary vascularity within normal limits. No focal lung infiltrates. Osseous structures grossly intact. Visualized upper abdomen    unremarkable.           Medical Decision Making  The patient's presentation was of moderate complexity (an acute illness with systemic symptoms).    The patient's evaluation involved:  review of external note(s) from 2 sources ([facility] and [facility] ED's)  ordering and/or review of 3+ test(s) in this encounter (see separate area of note for details)  independent interpretation of testing performed by another health professional (chest x-ray)    The patient's management necessitated moderate risk (limitations due to social determinants of health (homelessness)).    Assessments & Plan   Patient presenting with cough. Vitals in the ED unremarkable. Nursing notes reviewed.    Chart review notable for many ED visits (12 others this month).  Patient was diagnosed with community-acquired pneumonia 3/20 and was admitted until 3/22.  Patient further was evaluated on 3/22 at [facility] ED and 3/26 at the [facility] ED.  Patient was prescribed azithromycin and cefdinir after his hospitalization.     On exam tonight, lungs are clear with normal work of breathing.  WBC has returned to normal.  Electrolytes unremarkable.  Chest x-ray stable.    The complete clinical picture is most consistent with community-acquired  pneumonia in the process of recovery.  Small-moderate diarrhea likely secondary to antibiotics.  Loperamide prescribed.  Patient does have his antibiotics with him, has normal vital signs and normal work of breathing.  Patient appropriate for continued outpatient management.  After counseling on the diagnosis, work-up, and treatment plan, the patient was discharged. The patient was advised to follow-up with primary care in a few days. The patient was advised to return to the ED if worsening symptoms, or any urgent health concerns.     Final diagnoses:   Pneumonia due to infectious organism, unspecified laterality, unspecified part of lung   Homelessness   Diarrhea, unspecified type     Discharge Medication List as of 3/30/2024  4:59 AM        START taking these medications    Details   loperamide (IMODIUM A-D) 2 MG tablet Take 1 tablet (2 mg) by mouth 4 times daily as needed for diarrhea, Disp-20 tablet, R-0, Local Print           --  Tomas Salamanca MD   Emergency Medicine   [facility] EMERGENCY DEPARTMENT  3/30/2024       Tomas Salamanca MD  03/31/24 0129

## 2024-03-30 NOTE — DISCHARGE INSTRUCTIONS
Fortunately your testing today was normal.  Take the prescribed steroid medication as directed in addition to your previously prescribed antibiotics and follow-up with your primary care doctor.  Return to the ER for any worsening symptoms or other concerns.

## 2024-03-30 NOTE — ED NOTES
RN went into room to introduce self to pt, complete assessment and collect blood specimen. Pt would not allow this RN to complete assessment or answer questions. States he has told too many people and I should know what's going on by reading in his chart. RN educated on importance of assessment. Pt continuing to decline. Pt allowing RN to collect blood.

## 2024-03-30 NOTE — ED TRIAGE NOTES
Triage Assessment (Adult)       Row Name 03/30/24 0302          Triage Assessment    Airway WDL WDL        Respiratory WDL    Respiratory WDL X;cough     Cough Frequency frequent     Cough Type productive        Skin Circulation/Temperature WDL    Skin Circulation/Temperature WDL WDL        Cardiac WDL    Cardiac WDL WDL        Peripheral/Neurovascular WDL    Peripheral Neurovascular WDL WDL        Cognitive/Neuro/Behavioral WDL    Cognitive/Neuro/Behavioral WDL WDL

## 2024-03-30 NOTE — ED NOTES
After much investigation and speaking with care manager, care manager states ok to give cab voucher to

## 2024-03-30 NOTE — ED TRIAGE NOTES
Pt biba, pt is wanting check up for pneumonia.     Pt also reporting dehydration from loose stools. Pt reports beng homeless since February and has been struggling since.

## 2024-03-30 NOTE — ED NOTES
Bed: ED05  Expected date:   Expected time:   Means of arrival:   Comments:  Orin Younger 439 58 M pneumonia

## 2024-03-30 NOTE — ED NOTES
Bed: [facility]ED-04  Expected date: 3/30/24  Expected time:   Means of arrival: Ambulance  Comments:  [facility]

## 2024-03-30 NOTE — ED PROVIDER NOTES
EMERGENCY DEPARTMENT ENCOUnter      NAME: Terry Carmona  AGE: 58 year old male  YOB: 1965  MRN: [id number]  EVALUATION DATE & TIME: 3/30/2024  9:15 AM    PCP: Merlene Fallon    ED PROVIDER: Wally Ayala DO      Chief Complaint   Patient presents with    Multiple complaints         FINAL IMPRESSION:  1. Acute cough          ED COURSE & MEDICAL DECISION MAKIN:18 AM I met with the patient, obtained history, performed an initial exam, and discussed options and plan for diagnostics and treatment here in the ED.   10:52 AM We discussed plans for discharge including supportive cares, symptomatic treatment, outpatient follow up, and reasons to return to the emergency department.      The patient presented to the emergency department today complaining of cough and fatigue.  He was recently diagnosed with pneumonia.  He was [place] [facility] [place].  Laboratory testing today was unremarkable.  The patient has clinically appeared well.  Plan will be to start the patient on a short course of steroids and have him continue his previously prescribed antibiotics.  He has been instructed to follow-up closely on an outpatient basis and return for any worsening symptoms or other concerns.  He is comfortable with this plan.        Medical Decision Making  Obtained supplemental history:Supplemental history obtained?: No  Reviewed external records: External records reviewed?: Inpatient Record: [facility] Emergency Dept (3/22/2024)  Care impacted by chronic illness:Other: bipolar disorder, asthma, pneumonia   Care significantly affected by social determinants of health:Access to Medical Care, Food Insecurity, Housing Insecurity, Low Income, and No Transportation for Health Care  Did you consider but not order tests?: Work up considered but not performed and documented in chart, if applicable  Did you interpret images independently?: Independent interpretation of ECG and images  noted in documentation, when applicable.  Consultation discussion with other provider:Did you involve another provider (consultant, , pharmacy, etc.)?: No  Discharge. I prescribed additional prescription strength medication(s) as charted. See documentation for any additional details.    At the conclusion of the encounter I discussed the results of all of the tests and the disposition. The questions were answered. The patient or family acknowledged understanding and was agreeable with the care plan.         MEDICATIONS GIVEN IN THE EMERGENCY:  Medications   sodium chloride 0.9% BOLUS 1,000 mL (1,000 mLs Intravenous Not Given 3/30/24 0950)       NEW PRESCRIPTIONS STARTED AT TODAY'S ER VISIT  Discharge Medication List as of 3/30/2024 10:54 AM        START taking these medications    Details   predniSONE (DELTASONE) 20 MG tablet Take 2 tablets (40 mg) by mouth daily for 4 days Take two tablets (= 40mg) each day for 4 (four) days, Disp-8 tablet, R-0, E-Prescribe                =================================================================    HPI        Terry Carmona is a 58 year old male with a pertinent history of asthma, bipolar disorder, and asthma who presents to this ED EMS for evaluation of cough, diarrhea, and fatigue.    Per Chart Review:  Patient seen a [facility] ED on 3/22/2024 who presented with cough, fever, chills and some sore throat. He was admitted to [facility] 3/20-3/22 for pneumonia. At the admission, CXR showed bibasilar infiltrates consistent with infection, WBC 17.1 and he had slightly elevated procalcitonin. He reports he was discharged because of personal disagreements with staff, including becoming upset with nurses who woke him from sleep to take blood. He was prescribed antibiotics at discharge (Azithromycin and Omnicef). He presented to [facility] ED as he thought he would receive better care than [facility]. C. difficile was negative upon previous lab testing.    The patient reports that he was  "diagnosed with a pneumonia infection about 1 week ago. He was prescribed antibiotics which he says that he has been taking. Last night, patient left A[place] from [facility]. He says that since his pneumonia infection, he has had a cough, diarrhea, fatigue, and tiredness. He says his cough is brought on by laying down on his side, and improves when he is sitting up. Patient says that he has not gotten much sleep over the last few nights and is \"exhausted.\" He states that he is usually \"high energy\", however has been more tired recently. Patient says that his diarrhea started after drinking orange juice and eating guacamole at the same time.    Of important note, patient is homeless. He says that he does not go to the shelters in [place] due to \"racism\".     Denies shortness of breath or any other complaints at this time.       PAST MEDICAL HISTORY:  No past medical history on file.    PAST SURGICAL HISTORY:  No past surgical history on file.        CURRENT MEDICATIONS:    azithromycin (ZITHROMAX) 500 MG tablet  cefdinir (OMNICEF) 300 MG capsule  loperamide (IMODIUM A-D) 2 MG tablet  zinc oxide (DESITIN) 40 % paste        ALLERGIES:  No Known Allergies    FAMILY HISTORY:  No family history on file.    SOCIAL HISTORY:   Social History     Socioeconomic History    Marital status: Single   Substance and Sexual Activity    Alcohol use: Yes     Comment: 2 drinks of wine last 2 days    Drug use: No    Sexual activity: Not Currently     Social Determinants of Health     Housing Stability: High Risk (3/18/2024)    Received from [facility]    Housing Stability     What is your housing situation today?: 1 - I do not have housing (I am staying with others, in a hotel, in a shelter, living outside on ...       VITALS:  No data found.    PHYSICAL EXAM    Constitutional:  Well developed, Well nourished,  HENT:  Normocephalic, Atraumatic, Oropharynx moist, Nose normal.   Eyes:  EOMI, Conjunctiva normal, No discharge. "   Respiratory:  Normal breath sounds, No respiratory distress, No wheezing, No chest tenderness.   Cardiovascular:  Normal heart rate, Normal rhythm, No murmurs  GI:  Soft, No tenderness, No guarding, No CVA tenderness.   Musculoskeletal:  No tenderness to palpation or major deformities noted.   Extremities: No lower extremity edema.  Neurologic:  Alert & oriented x 3, No focal deficits noted.   Psychiatric:  Affect normal, Judgment normal, Mood normal.        LAB:  All pertinent labs reviewed and interpreted.  Results for orders placed or performed during the hospital encounter of 03/30/24   CBC with platelets   Result Value Ref Range    WBC Count 6.1 4.0 - 11.0 10e3/uL    RBC Count 4.49 4.40 - 5.90 10e6/uL    Hemoglobin 13.8 13.3 - 17.7 g/dL    Hematocrit 42.7 40.0 - 53.0 %    MCV 95 78 - 100 fL    MCH 30.7 26.5 - 33.0 pg    MCHC 32.3 31.5 - 36.5 g/dL    RDW 13.8 10.0 - 15.0 %    Platelet Count 349 150 - 450 10e3/uL   Basic metabolic panel   Result Value Ref Range    Sodium 144 135 - 145 mmol/L    Potassium 3.9 3.4 - 5.3 mmol/L    Chloride 109 (H) 98 - 107 mmol/L    Carbon Dioxide (CO2) 27 22 - 29 mmol/L    Anion Gap 8 7 - 15 mmol/L    Urea Nitrogen 9.7 8.0 - 23.0 mg/dL    Creatinine 0.73 0.67 - 1.17 mg/dL    GFR Estimate >90 >60 mL/min/1.73m2    Calcium 8.6 (L) 8.8 - 10.2 mg/dL    Glucose 89 70 - 99 mg/dL         I, Kar Jackman, am serving as a scribe to document services personally performed by Dr. Ayala based on my observation and the provider's statements to me. IWally, DO attest that Kar Jackman is acting in a scribe capacity, has observed my performance of the services and has documented them in accordance with my direction.    Wally Ayala DO  Emergency Medicine  [facility] EMERGENCY ROOM  [address] [id number] [phone]  Dept: [phone]     Wally Ayala DO  04/13/24 6372

## 2024-03-30 NOTE — ED TRIAGE NOTES
"Pt arrives to ED via EMS from a bus stop with multiple complaints. Endorses he was dx with pneumonia 1 week ago at [facility] was there again last night but left AMA. Pt states he's \"shelterless\" because he cannot go to shelters in the [facility] due to \"rasism\". Pt wishes to get closer to [place] or [place]. Pt endorses to ongoing cough and new diarrhea. Denies any shortness of breath or chest pain. Denies any pain in general.      Triage Assessment (Adult)       Row Name 03/30/24 0919          Triage Assessment    Airway WDL WDL        Respiratory WDL    Respiratory WDL X;cough     Cough Frequency infrequent     Cough Type productive        Skin Circulation/Temperature WDL    Skin Circulation/Temperature WDL WDL        Cardiac WDL    Cardiac WDL WDL        Peripheral/Neurovascular WDL    Peripheral Neurovascular WDL WDL        Cognitive/Neuro/Behavioral WDL    Cognitive/Neuro/Behavioral WDL WDL                     "

## 2024-03-30 NOTE — ED NOTES
"Pt left without discharge paperwork or handbook of the streets the care manager gave him. States \" I'm homeless no I'm shelterless I can't be carrying all this extra stuff\">   "

## 2024-03-30 NOTE — ED NOTES
Pt has no money no ride no home and no place to go. Pt states he needs a token for the bus but we do not supply those. Pt states he needs to get to the [employer].

## 2024-03-30 NOTE — ED NOTES
"RN went to give pt discharge paperwork and instructions. Pt upset of discharge plan for discharge stating staff is not compassionate as he wants to sleep. RN attempted to explain hospital protocol, however pt would interrupt and talk over this RN. Pt refusing discharge vitals and stated \"I'm going to call the police, I am going to refuse discharge and they are going to have to take me out of here\". RN attempted to give pt discharge paperwork and prescription, pt agitated and escalating, not accepting paperwork. RN exited room. Pt then went up to the nurses station and talking to additional staff members. This RN went into a pt's room. Pt followed this RN into room and began yelling racial slurs and using inappropriate language. Security required to escort pt out of ED.   "

## 2024-03-30 NOTE — ED NOTES
Pt asking for a ride. Charge nurse aware. Charge calls house lead. Security also aware and asked if they hade any resources for rides/Tried calling care management no answer. Left a voicemail. Pt is resting in room calm. Continuing to find pt a ride. Pt homeless and does not have an address to take a cab voucher.

## 2024-03-30 NOTE — ED NOTES
Pt has been verbally intimidating and abusive towards staff. Security aware. Very confrontational. Rambling. Flights of ideas. Writer went in to do blood work and iv and got the iv and blood work and pt did not like how the iv was going up an angled vein at an angle. Pt had RN take out IV and refusing fluids. States he will just drink orange juice and cranberry juice. Pt states he is just tired and wants to sleep states he hasn't slept well since he slept in terminal one at the airport till  kicked him out. When RN went back into room with juice pt us in the room putting on all his clothes. Unsure if pt is going to leave or not. Provided pt blankets. Provider notified about refusal of fluids.

## 2024-04-26 ENCOUNTER — APPOINTMENT (OUTPATIENT)
Dept: GENERAL RADIOLOGY | Facility: CLINIC | Age: 59
End: 2024-04-26
Attending: EMERGENCY MEDICINE
Payer: MEDICAID

## 2024-04-26 ENCOUNTER — HOSPITAL ENCOUNTER (EMERGENCY)
Facility: CLINIC | Age: 59
Discharge: HOME | End: 2024-04-26
Attending: EMERGENCY MEDICINE | Admitting: EMERGENCY MEDICINE
Payer: MEDICAID

## 2024-04-26 VITALS
SYSTOLIC BLOOD PRESSURE: 131 MMHG | DIASTOLIC BLOOD PRESSURE: 75 MMHG | OXYGEN SATURATION: 98 % | RESPIRATION RATE: 16 BRPM | TEMPERATURE: 97.5 F | HEART RATE: 96 BPM

## 2024-04-26 DIAGNOSIS — M79.674 PAIN OF TOE OF RIGHT FOOT: ICD-10-CM

## 2024-04-26 PROCEDURE — 99283 EMERGENCY DEPT VISIT LOW MDM: CPT

## 2024-04-26 PROCEDURE — 73630 X-RAY EXAM OF FOOT: CPT | Mod: RT

## 2024-04-26 ASSESSMENT — ACTIVITIES OF DAILY LIVING (ADL)
ADLS_ACUITY_SCORE: 37
ADLS_ACUITY_SCORE: 37

## 2024-04-26 ASSESSMENT — COLUMBIA-SUICIDE SEVERITY RATING SCALE - C-SSRS
6. HAVE YOU EVER DONE ANYTHING, STARTED TO DO ANYTHING, OR PREPARED TO DO ANYTHING TO END YOUR LIFE?: NO
1. IN THE PAST MONTH, HAVE YOU WISHED YOU WERE DEAD OR WISHED YOU COULD GO TO SLEEP AND NOT WAKE UP?: NO
2. HAVE YOU ACTUALLY HAD ANY THOUGHTS OF KILLING YOURSELF IN THE PAST MONTH?: NO

## 2024-04-26 NOTE — ED NOTES
Bed: ED28  Expected date:   Expected time:   Means of arrival:   Comments:  A545/ 68 M/ R toe pain

## 2024-04-26 NOTE — ED PROVIDER NOTES
History     Chief Complaint:  Toe Pain       HPI   Terry Carmona is a 58 year old male who presents to the emergency department with a right small toe pain.  He notes that the pain has been present and throbbing tonight.  He does not remember any injury but he was on the bus when this started hurting.  He is homeless and sleeps on the bus at night.  He has been walking in sandals.  He thinks that this might have something to do with his pain.  He denies any specific trauma to the area.  Has not had any fevers.      Independent Historian:   None - Patient Only      Medications:    azithromycin (ZITHROMAX) 500 MG tablet  cefdinir (OMNICEF) 300 MG capsule  loperamide (IMODIUM A-D) 2 MG tablet  zinc oxide (DESITIN) 40 % paste        Past Medical History:    No past medical history on file.    Past Surgical History:    No past surgical history on file.     Physical Exam   Patient Vitals for the past 24 hrs:   BP Temp Temp src Pulse Resp   04/26/24 0106 131/75 97.5  F (36.4  C) Oral 96 16        Physical Exam  Eyes:  The pupils are equal and round    Conjunctivae and sclerae are normal  ENT:    The nose is normal    Pinnae are normal  CV:  Normal right DP pulse. No edema of right foot  Resp:  Normal respiratory effort    Speaks in full sentences  MS:  Normal muscular tone    No asymmetric leg swelling    No deformity of the right small toe of right foot  Skin:  No erythema or deformity of the 5th toe on right foot. Right 3rd toe nail appears loosened. Cracking noted on the heel of the right foot without surrounding erythema or drainage  Neuro:   Awake, alert.      Speech is normal and fluent.    Face is symmetric.     Moves all extremities    SILT in toes of right foot       Emergency Department Course       Imaging:  Foot  XR, G/E 3 views, right   Final Result   IMPRESSION: Normal joint spaces and alignment. No fracture.             Laboratory:  Labs Ordered and Resulted from Time of ED Arrival to Time of ED Departure  - No data to display     Emergency Department Course & Assessments:    Interventions:  Medications - No data to display     Independent Interpretation (X-rays, CTs, rhythm strip):  XR Foot Right: No fractures    Consultations/Discussion of Management or Tests:  None        Social Determinants of Health affecting care:   None    Disposition:  The patient was discharged.     Impression & Plan      Medical Decision Making:  Terry Carmona is a 58 year old male who presents to the emergency department with pain in his right small toe.  On exam I do not see any evidence of an infection or deformity.  He denies trauma.  X-rays negative for fracture.  He has good capillary refill as well as a normal dorsalis pedis pulse on the right.  The right small toe was taped with paper tape to debride support.  He is given boots as he has been walking around in sandals.  Hopefully this will help protect his feet better.  He has some cracking of his skin along his heel which he asked me about which appears chronic.  No evidence of any active infection.  Patient was discharged home with his new boots.  He is encouraged to follow-up with podiatry as needed for foot pain.  Return with any new or worrisome symptoms.      Diagnosis:    ICD-10-CM    1. Pain of toe of right foot  M79.674            Discharge Medications:  New Prescriptions    No medications on file          Main Montes MD  4/26/2024   Main Montes MD [place], Aaron Joseph, MD  04/26/24 0158

## 2024-04-26 NOTE — ED TRIAGE NOTES
Pt arrives via EMS from [facility] for right 5th toe pain. Pain occurs with walking. VSS and no other complaints.     Triage Assessment (Adult)       Row Name 04/26/24 0108          Triage Assessment    Airway WDL WDL        Skin Circulation/Temperature WDL    Skin Circulation/Temperature WDL WDL        Cardiac WDL    Cardiac WDL WDL        Cognitive/Neuro/Behavioral WDL    Cognitive/Neuro/Behavioral WDL WDL

## 2024-05-17 ENCOUNTER — HOSPITAL ENCOUNTER (EMERGENCY)
Facility: CLINIC | Age: 59
Discharge: HOME | End: 2024-05-17
Attending: EMERGENCY MEDICINE | Admitting: EMERGENCY MEDICINE
Payer: MEDICAID

## 2024-05-17 VITALS
RESPIRATION RATE: 14 BRPM | HEART RATE: 98 BPM | HEIGHT: 73 IN | DIASTOLIC BLOOD PRESSURE: 63 MMHG | WEIGHT: 250 LBS | BODY MASS INDEX: 33.13 KG/M2 | SYSTOLIC BLOOD PRESSURE: 119 MMHG | TEMPERATURE: 99.1 F | OXYGEN SATURATION: 95 %

## 2024-05-17 DIAGNOSIS — G92.9 TOXIC ENCEPHALOPATHY: ICD-10-CM

## 2024-05-17 DIAGNOSIS — R00.0 SINUS TACHYCARDIA: ICD-10-CM

## 2024-05-17 LAB
ANION GAP SERPL CALCULATED.3IONS-SCNC: 13 MMOL/L (ref 7–15)
APAP SERPL-MCNC: <5 UG/ML (ref 10–30)
BASOPHILS # BLD AUTO: 0.1 10E3/UL (ref 0–0.2)
BASOPHILS NFR BLD AUTO: 1 %
BUN SERPL-MCNC: 13.9 MG/DL (ref 8–23)
CALCIUM SERPL-MCNC: 8.5 MG/DL (ref 8.8–10.2)
CHLORIDE SERPL-SCNC: 101 MMOL/L (ref 98–107)
CREAT SERPL-MCNC: 0.78 MG/DL (ref 0.67–1.17)
DEPRECATED HCO3 PLAS-SCNC: 24 MMOL/L (ref 22–29)
EGFRCR SERPLBLD CKD-EPI 2021: >90 ML/MIN/1.73M2
EOSINOPHIL # BLD AUTO: 0.7 10E3/UL (ref 0–0.7)
EOSINOPHIL NFR BLD AUTO: 9 %
ERYTHROCYTE [DISTWIDTH] IN BLOOD BY AUTOMATED COUNT: 14.6 % (ref 10–15)
ETHANOL SERPL-MCNC: <0.01 G/DL
GLUCOSE SERPL-MCNC: 130 MG/DL (ref 70–99)
HCT VFR BLD AUTO: 40.6 % (ref 40–53)
HGB BLD-MCNC: 12.9 G/DL (ref 13.3–17.7)
HOLD SPECIMEN: NORMAL
HOLD SPECIMEN: NORMAL
IMM GRANULOCYTES # BLD: 0 10E3/UL
IMM GRANULOCYTES NFR BLD: 0 %
LYMPHOCYTES # BLD AUTO: 2.1 10E3/UL (ref 0.8–5.3)
LYMPHOCYTES NFR BLD AUTO: 28 %
MCH RBC QN AUTO: 30.8 PG (ref 26.5–33)
MCHC RBC AUTO-ENTMCNC: 31.8 G/DL (ref 31.5–36.5)
MCV RBC AUTO: 97 FL (ref 78–100)
MONOCYTES # BLD AUTO: 0.9 10E3/UL (ref 0–1.3)
MONOCYTES NFR BLD AUTO: 13 %
NEUTROPHILS # BLD AUTO: 3.7 10E3/UL (ref 1.6–8.3)
NEUTROPHILS NFR BLD AUTO: 49 %
NRBC # BLD AUTO: 0 10E3/UL
NRBC BLD AUTO-RTO: 0 /100
PLATELET # BLD AUTO: 223 10E3/UL (ref 150–450)
POTASSIUM SERPL-SCNC: 3.8 MMOL/L (ref 3.4–5.3)
RBC # BLD AUTO: 4.19 10E6/UL (ref 4.4–5.9)
SALICYLATES SERPL-MCNC: <0.3 MG/DL
SODIUM SERPL-SCNC: 138 MMOL/L (ref 135–145)
WBC # BLD AUTO: 7.4 10E3/UL (ref 4–11)

## 2024-05-17 PROCEDURE — 80179 DRUG ASSAY SALICYLATE: CPT | Performed by: EMERGENCY MEDICINE

## 2024-05-17 PROCEDURE — 93005 ELECTROCARDIOGRAM TRACING: CPT

## 2024-05-17 PROCEDURE — 36415 COLL VENOUS BLD VENIPUNCTURE: CPT | Performed by: EMERGENCY MEDICINE

## 2024-05-17 PROCEDURE — 82374 ASSAY BLOOD CARBON DIOXIDE: CPT | Performed by: EMERGENCY MEDICINE

## 2024-05-17 PROCEDURE — 258N000003 HC RX IP 258 OP 636: Performed by: EMERGENCY MEDICINE

## 2024-05-17 PROCEDURE — 96360 HYDRATION IV INFUSION INIT: CPT

## 2024-05-17 PROCEDURE — 82077 ASSAY SPEC XCP UR&BREATH IA: CPT | Performed by: EMERGENCY MEDICINE

## 2024-05-17 PROCEDURE — 80143 DRUG ASSAY ACETAMINOPHEN: CPT | Performed by: EMERGENCY MEDICINE

## 2024-05-17 PROCEDURE — 99284 EMERGENCY DEPT VISIT MOD MDM: CPT | Mod: 25

## 2024-05-17 PROCEDURE — 85025 COMPLETE CBC W/AUTO DIFF WBC: CPT | Performed by: EMERGENCY MEDICINE

## 2024-05-17 RX ADMIN — SODIUM CHLORIDE 1000 ML: 9 INJECTION, SOLUTION INTRAVENOUS at 04:15

## 2024-05-17 ASSESSMENT — ACTIVITIES OF DAILY LIVING (ADL)
ADLS_ACUITY_SCORE: 37
ADLS_ACUITY_SCORE: 37

## 2024-05-17 ASSESSMENT — COLUMBIA-SUICIDE SEVERITY RATING SCALE - C-SSRS
2. HAVE YOU ACTUALLY HAD ANY THOUGHTS OF KILLING YOURSELF IN THE PAST MONTH?: NO
6. HAVE YOU EVER DONE ANYTHING, STARTED TO DO ANYTHING, OR PREPARED TO DO ANYTHING TO END YOUR LIFE?: NO
1. IN THE PAST MONTH, HAVE YOU WISHED YOU WERE DEAD OR WISHED YOU COULD GO TO SLEEP AND NOT WAKE UP?: NO

## 2024-05-17 NOTE — ED TRIAGE NOTES
[facility]  He reports taking THC gummies that he found in a garbage can at the airport. He began feeling anxious and [facility] the [facility].  [facility] he [facility] EMS and requested to be transported here.  EMS noted him to be anxious, restless, hypertensive and tachycardic with HR's 130-150.  He briefly went up to 170 BPM for EMS.  EMS gave 5 mg droperidol.  BS by .  On arrival to ED, he appears anxious, but otherwise is AO x4.      Triage Assessment (Adult)       Row Name 05/17/24 0358          Triage Assessment    Airway WDL WDL        Respiratory WDL    Respiratory WDL WDL        Skin Circulation/Temperature WDL    Skin Circulation/Temperature WDL WDL        Cardiac WDL    Cardiac WDL X;rhythm      Pulse Rate & Regularity tachycardic      Cardiac Rhythm ST        Peripheral/Neurovascular WDL    Peripheral Neurovascular WDL WDL        Cognitive/Neuro/Behavioral WDL    Cognitive/Neuro/Behavioral WDL WDL

## 2024-05-17 NOTE — ED PROVIDER NOTES
"History   Chief Complaint:  \"I got really high\"    ELA Carmona is a 58 year old male who presents by EMS for evaluation of tachycardia and feeling \"high\".  He states that he is currently homeless, [facility], around 8 or 9 PM tonight he states that he found a [place] of THC Gummies [place] the [place] airport, he took the whole bag, this was 3-4 Gummies.  Soon thereafter he started feeling very \"high\" as well as anxious and restless.  [facility] not [facility] , and states that he did not get formally evaluated there but instead an ambulance was called and brought him here to the emergency department.  He denies any intent to harm self or others.  He states he drinks alcohol most days but has never had the shakes or withdrawal.  He denies any other recent symptoms such as fever, cough, pain, or injuries.    Independent Historian: EMS, who states that he was tachycardic, he was given 5 mg of droperidol, and his blood sugar was 132.    Review of External Notes: His prior records including discharge summary from the [facility] where he was admitted for 2 nights in March for pneumonia, there is also documentation that he is suspected of bipolar and a personality disorder.    Medications:    loperamide (IMODIUM A-D) 2 MG tablet  zinc oxide (DESITIN) 40 % paste    Past Medical History:    Patient Active Problem List    Diagnosis Date Noted    Community acquired pneumonia 03/21/2024     Priority: Medium    Hypoxia 03/20/2024     Priority: Medium    Community acquired pneumonia, unspecified laterality 03/20/2024     Priority: Medium    Chronic headaches 03/19/2024     Priority: Medium    Edema, lower extremity 02/05/2019     Priority: Medium    Mood disorder (H24)      Priority: Medium    History of bipolar disorder      Priority: Medium    Cellulitis 01/31/2019     Priority: Medium    Homeless 01/29/2019     Priority: Medium     Last Assessment & Plan: " "   Formatting of this note might be different from the original.   Attempted to discuss housing situation but patient did not want to elaborate further. Discussed w patient that we would not be able to complete his disability paperwork here in Acute Care, but would recommend to establish PCP to further address needs. Patient in agreement w plan, will schedule w next available PCP.      Bipolar I disorder, most recent episode manic, moderate (H) 04/02/2012     Priority: Medium    Asthma 04/02/2012     Priority: Medium      Physical Exam   Patient Vitals for the past 24 hrs:   BP Temp Temp src Pulse Resp SpO2 Height Weight   05/17/24 0543 -- -- -- 98 14 95 % -- --   05/17/24 0530 -- -- -- 104 14 96 % -- --   05/17/24 0500 -- -- -- 105 18 92 % -- --   05/17/24 0430 119/63 -- -- 115 16 95 % -- --   05/17/24 0400 116/62 -- -- (!) 123 16 93 % -- --   05/17/24 0358 116/62 -- -- (!) 124 21 95 % -- --   05/17/24 0357 130/71 99.1  F (37.3  C) Oral (!) 125 18 94 % 1.854 m (6' 1\") 113.4 kg (250 lb)      Physical Exam  General: Male sitting upright in room 19  HENT: mucous membranes moist  Eyes: PERRL without nystagmus  CV: extremities well perfused, regular rhythm  Resp: normal effort, no stridor, no cough observed  GI: abdomen soft and nontender, no guarding  MSK: no bony tenderness   Skin: appropriately warm and dry  Neuro: alert, clear speech, oriented though somewhat poor historian, normal tone in extremities, ambulation not initially tested  Psych:  slightly anxious, cooperative, denies feeling suicidal, no evidence of hallucinations    Emergency Department Course   Electrocardiogram  ECG taken at 0357, ECG interpreted at 0410 by ASHER Ring MD  Sinus tachycardia  Rate 125 bpm. NJ interval 150. QRS duration 82. QTc 450    Laboratory:  Labs Ordered and Resulted from Time of ED Arrival to Time of ED Departure   BASIC METABOLIC PANEL - Abnormal       Result Value    Sodium 138      Potassium 3.8      Chloride 101      Carbon " Dioxide (CO2) 24      Anion Gap 13      Urea Nitrogen 13.9      Creatinine 0.78      GFR Estimate >90      Calcium 8.5 (*)     Glucose 130 (*)    CBC WITH PLATELETS AND DIFFERENTIAL - Abnormal    WBC Count 7.4      RBC Count 4.19 (*)     Hemoglobin 12.9 (*)     Hematocrit 40.6      MCV 97      MCH 30.8      MCHC 31.8      RDW 14.6      Platelet Count 223      % Neutrophils 49      % Lymphocytes 28      % Monocytes 13      % Eosinophils 9      % Basophils 1      % Immature Granulocytes 0      NRBCs per 100 WBC 0      Absolute Neutrophils 3.7      Absolute Lymphocytes 2.1      Absolute Monocytes 0.9      Absolute Eosinophils 0.7      Absolute Basophils 0.1      Absolute Immature Granulocytes 0.0      Absolute NRBCs 0.0     ACETAMINOPHEN LEVEL - Abnormal    Acetaminophen <5.0 (*)    ETHYL ALCOHOL LEVEL - Normal    Alcohol ethyl <0.01     SALICYLATE LEVEL - Normal    Salicylate <0.3          Emergency Department Course:  Reviewed:  I reviewed nursing notes, vitals, and past medical history    Assessments/Consultations/Discussion of Management or Tests :  I obtained history and examined the patient as noted above.   ED Course as of 05/17/24 0643   Fri May 17, 2024   0528 I rechecked patient, discussed test results.  HR improved.  He is asking for help to get dressed (IV still in).  I spoke with RN, plan for discharge soon.     Interventions:  Medications   sodium chloride 0.9% BOLUS 1,000 mL (0 mLs Intravenous Stopped 5/17/24 0534)        Social Determinants of Health affecting care:   Healthcare Access/Compliance, Employment/Unemployment, Financial Insecurity, Food Insecurity, Homelessness/Housing Insecurity, and Transportation    Disposition:  Discharged    Impression & Plan    Medical Decision Making:  His presentation can be explained by intoxication from THC edibles that he consumed in recent hours.  His tachycardia gradually resolved, as did his perception of being intoxicated.  Toxicologic labs were unremarkable.   His tachycardia is sinus.  He is ambulatory with a steady gait, I do not think that further diagnostic workup or hospitalization are indicated, nor that he meets criteria to be held against as well.  He was counseled against taking unknown medications or substances such as he did earlier tonight.  Given his improvement, I think he is appropriate for discharge, and he agrees.  Return for crisis at any hour.    Diagnosis:    ICD-10-CM    1. Toxic encephalopathy  G92.9       2. Sinus tachycardia  R00.0     resolved         5/17/2024   MD Jhonahtan Vogel, Fernando Duran MD  05/17/24 0644

## 2024-05-17 NOTE — ED NOTES
Bed: ED19  Expected date:   Expected time:   Means of arrival:   Comments:  HEMs 449 58M tachy 130, THC use 0340